# Patient Record
Sex: FEMALE | Race: WHITE | NOT HISPANIC OR LATINO | Employment: OTHER | URBAN - NONMETROPOLITAN AREA
[De-identification: names, ages, dates, MRNs, and addresses within clinical notes are randomized per-mention and may not be internally consistent; named-entity substitution may affect disease eponyms.]

---

## 2019-05-15 ENCOUNTER — HOSPITAL ENCOUNTER (OUTPATIENT)
Dept: MAMMOGRAPHY | Facility: HOSPITAL | Age: 44
Discharge: HOME | End: 2019-05-15
Admitting: NURSE PRACTITIONER

## 2019-05-15 DIAGNOSIS — Z12.39 SCREENING BREAST EXAMINATION: ICD-10-CM

## 2019-05-15 PROCEDURE — 77067 SCR MAMMO BI INCL CAD: CPT | Performed by: RADIOLOGY

## 2019-05-15 PROCEDURE — 77063 BREAST TOMOSYNTHESIS BI: CPT

## 2019-05-15 PROCEDURE — 77067 SCR MAMMO BI INCL CAD: CPT

## 2019-05-15 PROCEDURE — 77063 BREAST TOMOSYNTHESIS BI: CPT | Performed by: RADIOLOGY

## 2019-08-05 ENCOUNTER — OFFICE VISIT (OUTPATIENT)
Dept: SURGERY | Facility: CLINIC | Age: 44
End: 2019-08-05

## 2019-08-05 VITALS — HEIGHT: 64 IN | BODY MASS INDEX: 34.69 KG/M2 | WEIGHT: 203.2 LBS

## 2019-08-05 DIAGNOSIS — G40.219 PARTIAL SYMPTOMATIC EPILEPSY WITH COMPLEX PARTIAL SEIZURES, INTRACTABLE, WITHOUT STATUS EPILEPTICUS (HCC): Primary | ICD-10-CM

## 2019-08-05 PROCEDURE — 99202 OFFICE O/P NEW SF 15 MIN: CPT | Performed by: SURGERY

## 2019-08-05 RX ORDER — ALPRAZOLAM 1 MG/1
TABLET ORAL
Refills: 0 | COMMUNITY
Start: 2019-07-18

## 2019-08-05 RX ORDER — ESCITALOPRAM OXALATE 10 MG/1
10 TABLET ORAL DAILY
Refills: 3 | COMMUNITY
Start: 2019-08-02

## 2019-08-05 RX ORDER — OMEPRAZOLE 40 MG/1
40 CAPSULE, DELAYED RELEASE ORAL DAILY
Refills: 3 | COMMUNITY
Start: 2019-06-20

## 2019-08-05 RX ORDER — LANOLIN ALCOHOL/MO/W.PET/CERES
3 CREAM (GRAM) TOPICAL NIGHTLY PRN
Refills: 0 | COMMUNITY
Start: 2019-07-18

## 2019-08-05 RX ORDER — ATORVASTATIN CALCIUM 10 MG/1
TABLET, FILM COATED ORAL
Refills: 3 | COMMUNITY
Start: 2019-07-18

## 2019-08-05 RX ORDER — NYSTATIN 100000 [USP'U]/G
POWDER TOPICAL
Refills: 0 | COMMUNITY
Start: 2019-07-18

## 2019-08-05 RX ORDER — RANITIDINE 150 MG/1
TABLET ORAL
Refills: 3 | COMMUNITY
Start: 2019-07-23 | End: 2022-02-18

## 2019-08-05 RX ORDER — CLOBAZAM 20 MG/1
20 TABLET ORAL 2 TIMES DAILY
Refills: 2 | COMMUNITY
Start: 2019-08-03

## 2019-08-05 RX ORDER — LACOSAMIDE 200 MG/1
1 TABLET, FILM COATED ORAL 2 TIMES DAILY
Refills: 2 | COMMUNITY
Start: 2019-08-03

## 2019-08-05 RX ORDER — LAMOTRIGINE 150 MG/1
150 TABLET ORAL 2 TIMES DAILY
Refills: 2 | COMMUNITY
Start: 2019-07-13

## 2019-08-05 RX ORDER — CHOLECALCIFEROL (VITAMIN D3) 1250 MCG
CAPSULE ORAL
Refills: 2 | COMMUNITY
Start: 2019-07-23

## 2019-08-05 RX ORDER — ZIPRASIDONE HYDROCHLORIDE 80 MG/1
CAPSULE ORAL
Refills: 2 | COMMUNITY
Start: 2019-08-02

## 2019-08-05 RX ORDER — ZIPRASIDONE HYDROCHLORIDE 40 MG/1
CAPSULE ORAL
Refills: 2 | COMMUNITY
Start: 2019-08-02

## 2019-08-05 NOTE — PROGRESS NOTES
Subjective   Laura Witt is a 43 y.o. female here today for possible VNS replacement referred by Dr. Galarza.    History of Present Illness  Ms. Witt was seen in the office today to discuss VNS replacement.  The patient had the device initially placed in [place] in 2001.  She has had 2 battery changes, the last of which was 5 years ago.  The patient is now due for a battery change.  In order to upgrade to the 106 model the patient is going to have to have both the lead and the generator replacement performed.  The patient last saw her neurologist on 7/22/2019.  The patient is continuing to have seizures.  The mother states that she does not record them all if she did not witness them but there are times when it is obvious from the patient's level of drowsiness that she had a prior seizure.  Allergies   Allergen Reactions   • Bee Venom Anaphylaxis     Current Outpatient Medications   Medication Sig Dispense Refill   • ALPRAZolam (XANAX) 1 MG tablet TAKE ONE TABLET BY MOUTH EVERY DAY FOR ANXIETY  0   • atorvastatin (LIPITOR) 10 MG tablet TAKE ONE TABLET BY MOUTH EVERY NIGHT AT BEDTIME TO LOWER CHOLESTEROL  3   • Cholecalciferol (VITAMIN D3) 11383 units capsule TAKE ONE CAPSULE BY MOUTH EVERY WEEK FOR THE BONES  2   • escitalopram (LEXAPRO) 10 MG tablet Take 10 mg by mouth Daily. as directed  3   • LAMICTAL 150 MG tablet Take 150 mg by mouth 2 (Two) Times a Day. as directed  2   • melatonin 3 MG tablet TAKE ONE TABLET BY MOUTH EVERY NIGHT ONE HOUR BEFORE BEDTIME FOR SLEEP  0   • NYSTATIN 662897 UNIT/GM topical powder APPLY TO AFFECTED AREA EVERY DAY AS NEEDED  0   • omeprazole (priLOSEC) 40 MG capsule Take 40 mg by mouth Daily.  3   • ONFI 20 MG tablet Take 20 mg by mouth 2 (Two) Times a Day. as directed  2   • raNITIdine (ZANTAC) 150 MG tablet TAKE ONE TABLET BY MOUTH EVERY DAY FOR THE STOMACH  3   • VIMPAT 200 MG tablet Take 1 tablet by mouth 2 (Two) Times a Day. as directed  2   • ziprasidone (GEODON) 40  "MG capsule TAKE ONE CAPSULE BY MOUTH EVERY DAY AT NOON AS DIRECTED  2   • ziprasidone (GEODON) 80 MG capsule TAKE ONE CAPSULE BY MOUTH EVERY NIGHT AT BEDTIME AS DIRECTED  2     No current facility-administered medications for this visit.      Past Medical History:   Diagnosis Date   • Arthritis    • Hypothyroid    • Seizures (CMS/HCC)      Past Surgical History:   Procedure Laterality Date   • IMPLANTATION VAGAL NERVE STIMULATOR     • THYROID SURGERY       Review of Systems  General: negative  Integumentary: negative  Eyes: glasses  ENT: negative  Respiratory: shortness of breath  Gastrointestinal: abdominal pain  Cardiovascular: negative  Neurological: confusion  Psychiatric: depression  Hematologic/Lymphatic: negative  Genitourinary: negative  Musculoskeletal: negative  Endocrine: goiter  Breasts: negative        Objective   Ht 162.6 cm (64\")   Wt 92.2 kg (203 lb 3.2 oz)   BMI 34.88 kg/m²   Physical Exam  General:  This is a WD WN female in no acute distress  HEENT exam:  WNL. Sclera are anicteric.  EOMI  Neck:  supple, FROM, without thyromegaly, cervical or supraclavicular adenopathy  Lungs:  Respiratory effort normal. Auscultation: Clear, without wheezes, rhonchi, rales  Heart:  Regular rate and rhythm, without murmur, gallop, rub.  No pedal edema  Abdomen: Nontender, nondistended  Musculoskeletal:  muscle strength/tone is normal.  Gait and station: normal. No digital cyanosis  Psyc:  alert, oriented x 3.  Mood and affect are appropriate  skin:  Warm with good turgor.  Without rash or lesion.  Vagus nerve stimulator in the left anterior chest  extremities:  Examination of the extremities revealed no cyanosis, clubbing or edema.  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       Discussion/Summary: The risks of " the procedure particularly with replacement of a new stimulator include injury to the carotid and jugular, nerve injury.    Patient's Body mass index is 34.88 kg/m². BMI is above normal and info given.       No future appointments.      Please note that portions of this note were completed with a voice recognition program.

## 2019-08-30 NOTE — H&P
Subjective   Laura Witt is a 43 y.o. female here today for possible VNS replacement referred by Dr. Galarza.    History of Present Illness  Ms. Witt was seen in the office today to discuss VNS replacement.  The patient had the device initially placed in [place] in 2001.  She has had 2 battery changes, the last of which was 5 years ago.  The patient is now due for a battery change.  In order to upgrade to the 106 model the patient is going to have to have both the lead and the generator replacement performed.  The patient last saw her neurologist on 7/22/2019.  The patient is continuing to have seizures.  The mother states that she does not record them all if she did not witness them but there are times when it is obvious from the patient's level of drowsiness that she had a prior seizure.  Allergies   Allergen Reactions   • Bee Venom Anaphylaxis     Current Outpatient Medications   Medication Sig Dispense Refill   • ALPRAZolam (XANAX) 1 MG tablet TAKE ONE TABLET BY MOUTH EVERY DAY FOR ANXIETY  0   • atorvastatin (LIPITOR) 10 MG tablet TAKE ONE TABLET BY MOUTH EVERY NIGHT AT BEDTIME TO LOWER CHOLESTEROL  3   • Cholecalciferol (VITAMIN D3) 87470 units capsule TAKE ONE CAPSULE BY MOUTH EVERY WEEK FOR THE BONES  2   • escitalopram (LEXAPRO) 10 MG tablet Take 10 mg by mouth Daily. as directed  3   • LAMICTAL 150 MG tablet Take 150 mg by mouth 2 (Two) Times a Day. as directed  2   • melatonin 3 MG tablet TAKE ONE TABLET BY MOUTH EVERY NIGHT ONE HOUR BEFORE BEDTIME FOR SLEEP  0   • NYSTATIN 590601 UNIT/GM topical powder APPLY TO AFFECTED AREA EVERY DAY AS NEEDED  0   • omeprazole (priLOSEC) 40 MG capsule Take 40 mg by mouth Daily.  3   • ONFI 20 MG tablet Take 20 mg by mouth 2 (Two) Times a Day. as directed  2   • raNITIdine (ZANTAC) 150 MG tablet TAKE ONE TABLET BY MOUTH EVERY DAY FOR THE STOMACH  3   • VIMPAT 200 MG tablet Take 1 tablet by mouth 2 (Two) Times a Day. as directed  2   • ziprasidone (GEODON) 40  "MG capsule TAKE ONE CAPSULE BY MOUTH EVERY DAY AT NOON AS DIRECTED  2   • ziprasidone (GEODON) 80 MG capsule TAKE ONE CAPSULE BY MOUTH EVERY NIGHT AT BEDTIME AS DIRECTED  2     No current facility-administered medications for this visit.      Past Medical History:   Diagnosis Date   • Arthritis    • Hypothyroid    • Seizures (CMS/HCC)      Past Surgical History:   Procedure Laterality Date   • IMPLANTATION VAGAL NERVE STIMULATOR     • THYROID SURGERY       Review of Systems  General: negative  Integumentary: negative  Eyes: glasses  ENT: negative  Respiratory: shortness of breath  Gastrointestinal: abdominal pain  Cardiovascular: negative  Neurological: confusion  Psychiatric: depression  Hematologic/Lymphatic: negative  Genitourinary: negative  Musculoskeletal: negative  Endocrine: goiter  Breasts: negative        Objective   Ht 162.6 cm (64\")   Wt 92.2 kg (203 lb 3.2 oz)   BMI 34.88 kg/m²    Physical Exam  General:  This is a WD WN female in no acute distress  HEENT exam:  WNL. Sclera are anicteric.  EOMI  Neck:  supple, FROM, without thyromegaly, cervical or supraclavicular adenopathy  Lungs:  Respiratory effort normal. Auscultation: Clear, without wheezes, rhonchi, rales  Heart:  Regular rate and rhythm, without murmur, gallop, rub.  No pedal edema  Abdomen: Nontender, nondistended  Musculoskeletal:  muscle strength/tone is normal.  Gait and station: normal. No digital cyanosis  Psyc:  alert, oriented x 3.  Mood and affect are appropriate  skin:  Warm with good turgor.  Without rash or lesion.  Vagus nerve stimulator in the left anterior chest  extremities:  Examination of the extremities revealed no cyanosis, clubbing or edema.  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       Discussion/Summary: The risks " of the procedure particularly with replacement of a new stimulator include injury to the carotid and jugular, nerve injury.    Patient's Body mass index is 34.88 kg/m². BMI is above normal and info given.       No future appointments.      Please note that portions of this note were completed with a voice recognition program.  This document has been electronically signed by Merly GARNICA MD on August 30, 2019 11:30 AM

## 2019-09-03 ENCOUNTER — TELEPHONE (OUTPATIENT)
Dept: SURGERY | Facility: CLINIC | Age: 44
End: 2019-09-03

## 2019-09-04 ENCOUNTER — APPOINTMENT (OUTPATIENT)
Dept: PREADMISSION TESTING | Facility: HOSPITAL | Age: 44
End: 2019-09-04

## 2019-09-04 DIAGNOSIS — G40.219 PARTIAL SYMPTOMATIC EPILEPSY WITH COMPLEX PARTIAL SEIZURES, INTRACTABLE, WITHOUT STATUS EPILEPTICUS (HCC): ICD-10-CM

## 2019-09-04 LAB
ANION GAP SERPL CALCULATED.3IONS-SCNC: 11.3 MMOL/L (ref 5–15)
BUN BLD-MCNC: 12 MG/DL (ref 6–20)
BUN/CREAT SERPL: 16 (ref 7–25)
CALCIUM SPEC-SCNC: 9 MG/DL (ref 8.6–10.5)
CHLORIDE SERPL-SCNC: 103 MMOL/L (ref 98–107)
CO2 SERPL-SCNC: 27.7 MMOL/L (ref 22–29)
CREAT BLD-MCNC: 0.75 MG/DL (ref 0.57–1)
DEPRECATED RDW RBC AUTO: 44.2 FL (ref 37–54)
ERYTHROCYTE [DISTWIDTH] IN BLOOD BY AUTOMATED COUNT: 13 % (ref 12.3–15.4)
GFR SERPL CREATININE-BSD FRML MDRD: 84 ML/MIN/1.73
GLUCOSE BLD-MCNC: 90 MG/DL (ref 65–99)
HCT VFR BLD AUTO: 39.8 % (ref 34–46.6)
HGB BLD-MCNC: 13 G/DL (ref 12–15.9)
MCH RBC QN AUTO: 31.6 PG (ref 26.6–33)
MCHC RBC AUTO-ENTMCNC: 32.7 G/DL (ref 31.5–35.7)
MCV RBC AUTO: 96.6 FL (ref 79–97)
PLATELET # BLD AUTO: 213 10*3/MM3 (ref 140–450)
PMV BLD AUTO: 10.4 FL (ref 6–12)
POTASSIUM BLD-SCNC: 3.8 MMOL/L (ref 3.5–5.2)
RBC # BLD AUTO: 4.12 10*6/MM3 (ref 3.77–5.28)
SODIUM BLD-SCNC: 142 MMOL/L (ref 136–145)
WBC NRBC COR # BLD: 4.78 10*3/MM3 (ref 3.4–10.8)

## 2019-09-04 PROCEDURE — 85027 COMPLETE CBC AUTOMATED: CPT | Performed by: SURGERY

## 2019-09-04 PROCEDURE — 80048 BASIC METABOLIC PNL TOTAL CA: CPT | Performed by: SURGERY

## 2019-09-04 PROCEDURE — 36415 COLL VENOUS BLD VENIPUNCTURE: CPT

## 2019-09-04 RX ORDER — CETIRIZINE HYDROCHLORIDE 10 MG/1
10 TABLET ORAL DAILY
COMMUNITY

## 2019-09-04 NOTE — DISCHARGE INSTRUCTIONS

## 2019-09-09 ENCOUNTER — TELEPHONE (OUTPATIENT)
Dept: SURGERY | Facility: CLINIC | Age: 44
End: 2019-09-09

## 2019-09-10 ENCOUNTER — ANESTHESIA (OUTPATIENT)
Dept: PERIOP | Facility: HOSPITAL | Age: 44
End: 2019-09-10

## 2019-09-10 ENCOUNTER — ANESTHESIA EVENT (OUTPATIENT)
Dept: PERIOP | Facility: HOSPITAL | Age: 44
End: 2019-09-10

## 2019-09-10 ENCOUNTER — HOSPITAL ENCOUNTER (OUTPATIENT)
Facility: HOSPITAL | Age: 44
Setting detail: HOSPITAL OUTPATIENT SURGERY
Discharge: HOME | End: 2019-09-10
Attending: SURGERY | Admitting: SURGERY

## 2019-09-10 VITALS
SYSTOLIC BLOOD PRESSURE: 112 MMHG | HEART RATE: 72 BPM | TEMPERATURE: 97.7 F | HEIGHT: 64 IN | WEIGHT: 205 LBS | OXYGEN SATURATION: 98 % | BODY MASS INDEX: 35 KG/M2 | RESPIRATION RATE: 18 BRPM | DIASTOLIC BLOOD PRESSURE: 73 MMHG

## 2019-09-10 DIAGNOSIS — G40.219 PARTIAL SYMPTOMATIC EPILEPSY WITH COMPLEX PARTIAL SEIZURES, INTRACTABLE, WITHOUT STATUS EPILEPTICUS (HCC): ICD-10-CM

## 2019-09-10 LAB
B-HCG UR QL: NEGATIVE
INTERNAL NEGATIVE CONTROL: NEGATIVE
INTERNAL POSITIVE CONTROL: POSITIVE
Lab: NORMAL

## 2019-09-10 PROCEDURE — 25010000002 NEOSTIGMINE 10 MG/10ML SOLUTION: Performed by: NURSE ANESTHETIST, CERTIFIED REGISTERED

## 2019-09-10 PROCEDURE — 25010000002 FENTANYL CITRATE (PF) 100 MCG/2ML SOLUTION: Performed by: NURSE ANESTHETIST, CERTIFIED REGISTERED

## 2019-09-10 PROCEDURE — 25010000002 MIDAZOLAM PER 1 MG: Performed by: NURSE ANESTHETIST, CERTIFIED REGISTERED

## 2019-09-10 PROCEDURE — 25010000003 CEFAZOLIN SODIUM-DEXTROSE 2-3 GM-%(50ML) RECONSTITUTED SOLUTION: Performed by: SURGERY

## 2019-09-10 PROCEDURE — 64570 REMOVE VAGUS N ELTRD: CPT | Performed by: SURGERY

## 2019-09-10 PROCEDURE — 25010000002 ONDANSETRON PER 1 MG: Performed by: NURSE ANESTHETIST, CERTIFIED REGISTERED

## 2019-09-10 PROCEDURE — 25010000002 PROPOFOL 10 MG/ML EMULSION: Performed by: NURSE ANESTHETIST, CERTIFIED REGISTERED

## 2019-09-10 PROCEDURE — 64568 OPN IMPLTJ CRNL NRV NEA&PG: CPT | Performed by: SURGERY

## 2019-09-10 PROCEDURE — C1778 LEAD, NEUROSTIMULATOR: HCPCS | Performed by: SURGERY

## 2019-09-10 PROCEDURE — C1767 GENERATOR, NEURO NON-RECHARG: HCPCS | Performed by: SURGERY

## 2019-09-10 PROCEDURE — 81025 URINE PREGNANCY TEST: CPT | Performed by: ANESTHESIOLOGY

## 2019-09-10 DEVICE — IMPLANTABLE LEAD
Type: IMPLANTABLE DEVICE | Site: CHEST | Status: FUNCTIONAL
Brand: VNS THERAPY® PERENNIADURA® MODEL 303 LEAD

## 2019-09-10 DEVICE — IMPLANTABLE PULSE GENERATOR
Type: IMPLANTABLE DEVICE | Site: CHEST | Status: FUNCTIONAL
Brand: VNS THERAPY® ASPIRESR® MODEL 106 GENERATOR

## 2019-09-10 RX ORDER — CEFAZOLIN SODIUM 2 G/50ML
2 SOLUTION INTRAVENOUS ONCE
Status: COMPLETED | OUTPATIENT
Start: 2019-09-10 | End: 2019-09-10

## 2019-09-10 RX ORDER — GLYCOPYRROLATE 0.2 MG/ML
INJECTION INTRAMUSCULAR; INTRAVENOUS AS NEEDED
Status: DISCONTINUED | OUTPATIENT
Start: 2019-09-10 | End: 2019-09-10 | Stop reason: SURG

## 2019-09-10 RX ORDER — MIDAZOLAM HYDROCHLORIDE 1 MG/ML
INJECTION INTRAMUSCULAR; INTRAVENOUS AS NEEDED
Status: DISCONTINUED | OUTPATIENT
Start: 2019-09-10 | End: 2019-09-10 | Stop reason: SURG

## 2019-09-10 RX ORDER — HYDROCODONE BITARTRATE AND ACETAMINOPHEN 7.5; 325 MG/1; MG/1
1 TABLET ORAL 4 TIMES DAILY PRN
Qty: 15 TABLET | Refills: 0 | Status: SHIPPED | OUTPATIENT
Start: 2019-09-10 | End: 2022-02-18

## 2019-09-10 RX ORDER — FENTANYL CITRATE 50 UG/ML
50 INJECTION, SOLUTION INTRAMUSCULAR; INTRAVENOUS
Status: DISCONTINUED | OUTPATIENT
Start: 2019-09-10 | End: 2019-09-10 | Stop reason: HOSPADM

## 2019-09-10 RX ORDER — NEOSTIGMINE METHYLSULFATE 1 MG/ML
INJECTION, SOLUTION INTRAVENOUS AS NEEDED
Status: DISCONTINUED | OUTPATIENT
Start: 2019-09-10 | End: 2019-09-10 | Stop reason: SURG

## 2019-09-10 RX ORDER — SODIUM CHLORIDE 0.9 % (FLUSH) 0.9 %
3 SYRINGE (ML) INJECTION EVERY 12 HOURS SCHEDULED
Status: DISCONTINUED | OUTPATIENT
Start: 2019-09-10 | End: 2019-09-10 | Stop reason: HOSPADM

## 2019-09-10 RX ORDER — ONDANSETRON 2 MG/ML
INJECTION INTRAMUSCULAR; INTRAVENOUS AS NEEDED
Status: DISCONTINUED | OUTPATIENT
Start: 2019-09-10 | End: 2019-09-10 | Stop reason: SURG

## 2019-09-10 RX ORDER — MEPERIDINE HYDROCHLORIDE 25 MG/ML
12.5 INJECTION INTRAMUSCULAR; INTRAVENOUS; SUBCUTANEOUS
Status: DISCONTINUED | OUTPATIENT
Start: 2019-09-10 | End: 2019-09-10 | Stop reason: HOSPADM

## 2019-09-10 RX ORDER — OXYCODONE HYDROCHLORIDE AND ACETAMINOPHEN 5; 325 MG/1; MG/1
1 TABLET ORAL ONCE AS NEEDED
Status: DISCONTINUED | OUTPATIENT
Start: 2019-09-10 | End: 2019-09-10 | Stop reason: HOSPADM

## 2019-09-10 RX ORDER — PROPOFOL 10 MG/ML
VIAL (ML) INTRAVENOUS AS NEEDED
Status: DISCONTINUED | OUTPATIENT
Start: 2019-09-10 | End: 2019-09-10 | Stop reason: SURG

## 2019-09-10 RX ORDER — SODIUM CHLORIDE 0.9 % (FLUSH) 0.9 %
3-10 SYRINGE (ML) INJECTION AS NEEDED
Status: DISCONTINUED | OUTPATIENT
Start: 2019-09-10 | End: 2019-09-10 | Stop reason: HOSPADM

## 2019-09-10 RX ORDER — ROCURONIUM BROMIDE 10 MG/ML
INJECTION, SOLUTION INTRAVENOUS AS NEEDED
Status: DISCONTINUED | OUTPATIENT
Start: 2019-09-10 | End: 2019-09-10 | Stop reason: SURG

## 2019-09-10 RX ORDER — ONDANSETRON 2 MG/ML
4 INJECTION INTRAMUSCULAR; INTRAVENOUS AS NEEDED
Status: DISCONTINUED | OUTPATIENT
Start: 2019-09-10 | End: 2019-09-10 | Stop reason: HOSPADM

## 2019-09-10 RX ORDER — MAGNESIUM HYDROXIDE 1200 MG/15ML
LIQUID ORAL AS NEEDED
Status: DISCONTINUED | OUTPATIENT
Start: 2019-09-10 | End: 2019-09-10 | Stop reason: HOSPADM

## 2019-09-10 RX ORDER — FENTANYL CITRATE 50 UG/ML
INJECTION, SOLUTION INTRAMUSCULAR; INTRAVENOUS AS NEEDED
Status: DISCONTINUED | OUTPATIENT
Start: 2019-09-10 | End: 2019-09-10 | Stop reason: SURG

## 2019-09-10 RX ORDER — CEFAZOLIN SODIUM 2 G/50ML
2 SOLUTION INTRAVENOUS ONCE
Status: DISCONTINUED | OUTPATIENT
Start: 2019-09-10 | End: 2019-09-10 | Stop reason: HOSPADM

## 2019-09-10 RX ORDER — FAMOTIDINE 10 MG/ML
INJECTION, SOLUTION INTRAVENOUS AS NEEDED
Status: DISCONTINUED | OUTPATIENT
Start: 2019-09-10 | End: 2019-09-10 | Stop reason: SURG

## 2019-09-10 RX ORDER — IPRATROPIUM BROMIDE AND ALBUTEROL SULFATE 2.5; .5 MG/3ML; MG/3ML
3 SOLUTION RESPIRATORY (INHALATION) ONCE AS NEEDED
Status: DISCONTINUED | OUTPATIENT
Start: 2019-09-10 | End: 2019-09-10 | Stop reason: HOSPADM

## 2019-09-10 RX ORDER — SODIUM CHLORIDE, SODIUM LACTATE, POTASSIUM CHLORIDE, CALCIUM CHLORIDE 600; 310; 30; 20 MG/100ML; MG/100ML; MG/100ML; MG/100ML
125 INJECTION, SOLUTION INTRAVENOUS CONTINUOUS
Status: DISCONTINUED | OUTPATIENT
Start: 2019-09-10 | End: 2019-09-10 | Stop reason: HOSPADM

## 2019-09-10 RX ORDER — LIDOCAINE HYDROCHLORIDE 20 MG/ML
INJECTION, SOLUTION INFILTRATION; PERINEURAL AS NEEDED
Status: DISCONTINUED | OUTPATIENT
Start: 2019-09-10 | End: 2019-09-10 | Stop reason: SURG

## 2019-09-10 RX ADMIN — FENTANYL CITRATE 50 MCG: 50 INJECTION INTRAMUSCULAR; INTRAVENOUS at 16:08

## 2019-09-10 RX ADMIN — CEFAZOLIN SODIUM 2 G: 2 SOLUTION INTRAVENOUS at 14:15

## 2019-09-10 RX ADMIN — SODIUM CHLORIDE, POTASSIUM CHLORIDE, SODIUM LACTATE AND CALCIUM CHLORIDE 125 ML/HR: 600; 310; 30; 20 INJECTION, SOLUTION INTRAVENOUS at 13:42

## 2019-09-10 RX ADMIN — LIDOCAINE HYDROCHLORIDE 40 MG: 20 INJECTION, SOLUTION INFILTRATION; PERINEURAL at 14:06

## 2019-09-10 RX ADMIN — MIDAZOLAM HYDROCHLORIDE 2 MG: 1 INJECTION, SOLUTION INTRAMUSCULAR; INTRAVENOUS at 14:06

## 2019-09-10 RX ADMIN — NEOSTIGMINE METHYLSULFATE 3 MG: 1 INJECTION, SOLUTION INTRAVENOUS at 15:30

## 2019-09-10 RX ADMIN — ONDANSETRON 4 MG: 2 INJECTION INTRAMUSCULAR; INTRAVENOUS at 14:06

## 2019-09-10 RX ADMIN — FENTANYL CITRATE 25 MCG: 50 INJECTION INTRAMUSCULAR; INTRAVENOUS at 14:53

## 2019-09-10 RX ADMIN — GLYCOPYRROLATE 0.4 MG: 0.4 INJECTION INTRAMUSCULAR; INTRAVENOUS at 15:30

## 2019-09-10 RX ADMIN — FAMOTIDINE 20 MG: 10 INJECTION INTRAVENOUS at 14:06

## 2019-09-10 RX ADMIN — FENTANYL CITRATE 50 MCG: 50 INJECTION INTRAMUSCULAR; INTRAVENOUS at 14:06

## 2019-09-10 RX ADMIN — SODIUM CHLORIDE, POTASSIUM CHLORIDE, SODIUM LACTATE AND CALCIUM CHLORIDE: 600; 310; 30; 20 INJECTION, SOLUTION INTRAVENOUS at 15:18

## 2019-09-10 RX ADMIN — PROPOFOL 150 MG: 10 INJECTION, EMULSION INTRAVENOUS at 14:08

## 2019-09-10 RX ADMIN — FENTANYL CITRATE 25 MCG: 50 INJECTION INTRAMUSCULAR; INTRAVENOUS at 14:25

## 2019-09-10 RX ADMIN — ROCURONIUM BROMIDE 40 MG: 10 SOLUTION INTRAVENOUS at 14:08

## 2019-09-10 NOTE — ANESTHESIA POSTPROCEDURE EVALUATION
Patient: Laura Witt    Procedure Summary     Date:  09/10/19 Room / Location:   [facility] /  [facility]    Anesthesia Start:  1402 Anesthesia Stop:  1545    Procedures:       VAGUS NERVE STIMULATOR REMOVAL (N/A )      VAGUS NERVE STIMULATOR IMPLANTATION (N/A Chest) Diagnosis:       Partial symptomatic epilepsy with complex partial seizures, intractable, without status epilepticus (CMS/HCC)      (Partial symptomatic epilepsy with complex partial seizures, intractable, without status epilepticus (CMS/HCC) [G40.219])    Surgeon:  Merly Jordan MD Provider:  Bennie Marshall MD    Anesthesia Type:  general ASA Status:  3          Anesthesia Type: general  Last vitals  BP   104/74 (09/10/19 1329)   Temp   99.1 °F (37.3 °C) (09/10/19 1329)   Pulse   69 (09/10/19 1329)   Resp   20 (09/10/19 1329)     SpO2   97 % (09/10/19 1329)     Post Anesthesia Care and Evaluation    Patient location during evaluation: bedside  Patient participation: complete - patient participated  Level of consciousness: awake and alert  Pain score: 1  Pain management: adequate  Airway patency: patent  Anesthetic complications: No anesthetic complications  PONV Status: none  Cardiovascular status: acceptable  Respiratory status: acceptable  Hydration status: acceptable

## 2019-09-10 NOTE — ANESTHESIA PROCEDURE NOTES
Airway  Urgency: elective    Date/Time: 9/10/2019 2:09 PM  Airway not difficult    General Information and Staff    Patient location during procedure: OR  CRNA: Nora Cloeman CRNA    Indications and Patient Condition  Indications for airway management: airway protection    Preoxygenated: yes  MILS maintained throughout  Mask difficulty assessment: 1 - vent by mask    Final Airway Details  Final airway type: endotracheal airway      Successful airway: ETT  Cuffed: yes   Successful intubation technique: direct laryngoscopy  Facilitating devices/methods: intubating stylet  Endotracheal tube insertion site: oral  Blade: Leticia  Blade size: 3  ETT size (mm): 7.0  Cormack-Lehane Classification: grade IIa - partial view of glottis  Placement verified by: chest auscultation and capnometry   Measured from: lips  ETT/EBT  to lips (cm): 19  Number of attempts at approach: 1  Assessment: lips, teeth, and gum same as pre-op and atraumatic intubation

## 2019-09-10 NOTE — ANESTHESIA PREPROCEDURE EVALUATION
Anesthesia Evaluation     no history of anesthetic complications:  NPO Solid Status: > 8 hours  NPO Liquid Status: > 8 hours           Airway   Mallampati: II  TM distance: >3 FB  Neck ROM: full  No difficulty expected  Dental - normal exam     Pulmonary - normal exam   Cardiovascular - normal exam    (+) hyperlipidemia,       Neuro/Psych  (+) seizures,     GI/Hepatic/Renal/Endo    (+)   hypothyroidism,     Musculoskeletal     Abdominal  - normal exam   Substance History      OB/GYN          Other        ROS/Med Hx Other: Mild MR                  Anesthesia Plan    ASA 3     general     intravenous induction   Anesthetic plan, all risks, benefits, and alternatives have been provided, discussed and informed consent has been obtained with: patient and mother.

## 2019-09-23 ENCOUNTER — OFFICE VISIT (OUTPATIENT)
Dept: SURGERY | Facility: CLINIC | Age: 44
End: 2019-09-23

## 2019-09-23 ENCOUNTER — TRANSCRIBE ORDERS (OUTPATIENT)
Dept: OTHER | Facility: OTHER | Age: 44
End: 2019-09-23

## 2019-09-23 ENCOUNTER — HOSPITAL ENCOUNTER (OUTPATIENT)
Dept: GENERAL RADIOLOGY | Facility: HOSPITAL | Age: 44
Discharge: HOME | End: 2019-09-23
Admitting: PSYCHIATRY & NEUROLOGY

## 2019-09-23 VITALS
DIASTOLIC BLOOD PRESSURE: 79 MMHG | SYSTOLIC BLOOD PRESSURE: 116 MMHG | HEIGHT: 64 IN | HEART RATE: 81 BPM | WEIGHT: 202.6 LBS | BODY MASS INDEX: 34.59 KG/M2

## 2019-09-23 DIAGNOSIS — M54.2 CERVICALGIA: ICD-10-CM

## 2019-09-23 DIAGNOSIS — G40.219 PARTIAL SYMPTOMATIC EPILEPSY WITH COMPLEX PARTIAL SEIZURES, INTRACTABLE, WITHOUT STATUS EPILEPTICUS (HCC): Primary | ICD-10-CM

## 2019-09-23 DIAGNOSIS — M54.2 CERVICALGIA: Primary | ICD-10-CM

## 2019-09-23 DIAGNOSIS — Z09 POSTOP CHECK: ICD-10-CM

## 2019-09-23 PROCEDURE — 99024 POSTOP FOLLOW-UP VISIT: CPT | Performed by: SURGERY

## 2019-09-23 PROCEDURE — 72050 X-RAY EXAM NECK SPINE 4/5VWS: CPT | Performed by: RADIOLOGY

## 2019-09-23 PROCEDURE — 72050 X-RAY EXAM NECK SPINE 4/5VWS: CPT

## 2019-09-23 NOTE — PROGRESS NOTES
Subjective   Laura Witt is a 43 y.o. female here today for post op.    History of Present Illness  Ms. Witt was seen in the office today for a postoperative visit following placement of a new vagus nerve stimulator device.  Patient voices no complaints related to her surgery  Allergies   Allergen Reactions   • Bee Venom Anaphylaxis         Current Outpatient Medications   Medication Sig Dispense Refill   • ALPRAZolam (XANAX) 1 MG tablet TAKE ONE TABLET BY MOUTH EVERY DAY FOR ANXIETY  0   • atorvastatin (LIPITOR) 10 MG tablet TAKE ONE TABLET BY MOUTH EVERY NIGHT AT BEDTIME TO LOWER CHOLESTEROL  3   • cetirizine (zyrTEC) 10 MG tablet Take 10 mg by mouth Daily.     • Cholecalciferol (VITAMIN D3) 26448 units capsule TAKE ONE CAPSULE BY MOUTH EVERY WEEK FOR THE BONES  2   • EPINEPHrine (EPIPEN 2-RUSH IJ) Inject 1 pen as directed As Needed.     • escitalopram (LEXAPRO) 10 MG tablet Take 10 mg by mouth Daily. as directed  3   • HYDROcodone-acetaminophen (NORCO) 7.5-325 MG per tablet Take 1 tablet by mouth 4 (Four) Times a Day As Needed for Moderate Pain. 15 tablet 0   • LAMICTAL 150 MG tablet Take 150 mg by mouth 2 (Two) Times a Day. as directed  2   • melatonin 3 MG tablet TAKE ONE TABLET BY MOUTH EVERY NIGHT ONE HOUR BEFORE BEDTIME FOR SLEEP  0   • NYSTATIN 053950 UNIT/GM topical powder APPLY TO AFFECTED AREA EVERY DAY AS NEEDED  0   • omeprazole (priLOSEC) 40 MG capsule Take 40 mg by mouth Daily.  3   • ONFI 20 MG tablet Take 20 mg by mouth 2 (Two) Times a Day. as directed  2   • raNITIdine (ZANTAC) 150 MG tablet TAKE ONE TABLET BY MOUTH EVERY DAY FOR THE STOMACH  3   • VIMPAT 200 MG tablet Take 1 tablet by mouth 2 (Two) Times a Day. as directed  2   • ziprasidone (GEODON) 40 MG capsule TAKE ONE CAPSULE BY MOUTH EVERY DAY AT NOON AS DIRECTED  2   • ziprasidone (GEODON) 80 MG capsule TAKE ONE CAPSULE BY MOUTH EVERY NIGHT AT BEDTIME AS DIRECTED  2   • HYDROcodone-acetaminophen (NORCO) 7.5-325 MG per tablet Take 1  "tablet by mouth 4 (Four) Times a Day As Needed for Moderate Pain . 15 tablet 0     No current facility-administered medications for this visit.        Objective   /79 (BP Location: Left arm)   Pulse 81   Ht 162.6 cm (64\")   Wt 91.9 kg (202 lb 9.6 oz)   BMI 34.78 kg/m²    Physical Exam  On examination this is a well-developed well-nourished female in no acute distress  HEENT examination: Within normal limits.  Conjunctiva pink.  Nose and ears appear normal.  Neck: Supple, full range of motion.  No JVD.  Musculoskeletal: Full range of motion all extremities without focal weakness. Normal gait. No digital cyanosis.  Psych: Patient is alert, oriented x3. Mood and affect are appropriate.  Skin: Healing surgical scars in the left neck and chest  Results/Data      Procedures     Assessment/Plan   Stable course, status post vagus nerve stimulator replacement    Follow-up as needed       Discussion/Summary    Future Appointments   Date Time Provider Department Center   9/23/2019  1:50 PM Merly Jordan MD [facility] None         Please note that portions of this note were completed with a voice recognition program.  "

## 2019-11-22 ENCOUNTER — TRANSCRIBE ORDERS (OUTPATIENT)
Dept: ADMINISTRATIVE | Facility: HOSPITAL | Age: 44
End: 2019-11-22

## 2019-11-22 DIAGNOSIS — R10.2 FEMALE PELVIC-PERINEAL PAIN SYNDROME: Primary | ICD-10-CM

## 2019-12-04 ENCOUNTER — HOSPITAL ENCOUNTER (OUTPATIENT)
Dept: ULTRASOUND IMAGING | Facility: HOSPITAL | Age: 44
Discharge: HOME | End: 2019-12-04
Admitting: OBSTETRICS & GYNECOLOGY

## 2019-12-04 DIAGNOSIS — R10.2 FEMALE PELVIC-PERINEAL PAIN SYNDROME: ICD-10-CM

## 2019-12-04 PROCEDURE — 76856 US EXAM PELVIC COMPLETE: CPT

## 2019-12-04 PROCEDURE — 76856 US EXAM PELVIC COMPLETE: CPT | Performed by: RADIOLOGY

## 2020-01-13 ENCOUNTER — TRANSCRIBE ORDERS (OUTPATIENT)
Dept: ADMINISTRATIVE | Facility: HOSPITAL | Age: 45
End: 2020-01-13

## 2020-01-13 DIAGNOSIS — R10.9 ABDOMINAL PAIN, UNSPECIFIED ABDOMINAL LOCATION: Primary | ICD-10-CM

## 2020-01-16 ENCOUNTER — APPOINTMENT (OUTPATIENT)
Dept: ULTRASOUND IMAGING | Facility: HOSPITAL | Age: 45
End: 2020-01-16

## 2020-01-17 ENCOUNTER — HOSPITAL ENCOUNTER (OUTPATIENT)
Dept: ULTRASOUND IMAGING | Facility: HOSPITAL | Age: 45
Discharge: HOME | End: 2020-01-17
Admitting: NURSE PRACTITIONER

## 2020-01-17 DIAGNOSIS — R10.9 ABDOMINAL PAIN, UNSPECIFIED ABDOMINAL LOCATION: ICD-10-CM

## 2020-01-17 PROCEDURE — 76705 ECHO EXAM OF ABDOMEN: CPT

## 2020-01-17 PROCEDURE — 76705 ECHO EXAM OF ABDOMEN: CPT | Performed by: RADIOLOGY

## 2020-07-30 ENCOUNTER — TRANSCRIBE ORDERS (OUTPATIENT)
Dept: ADMINISTRATIVE | Facility: HOSPITAL | Age: 45
End: 2020-07-30

## 2020-07-30 DIAGNOSIS — E04.2 MULTINODULAR GOITER: Primary | ICD-10-CM

## 2020-08-04 ENCOUNTER — APPOINTMENT (OUTPATIENT)
Dept: ULTRASOUND IMAGING | Facility: HOSPITAL | Age: 45
End: 2020-08-04

## 2020-08-05 ENCOUNTER — HOSPITAL ENCOUNTER (OUTPATIENT)
Dept: ULTRASOUND IMAGING | Facility: HOSPITAL | Age: 45
Discharge: HOME | End: 2020-08-05
Admitting: OTOLARYNGOLOGY

## 2020-08-05 DIAGNOSIS — E04.2 MULTINODULAR GOITER: ICD-10-CM

## 2020-08-05 PROCEDURE — 76536 US EXAM OF HEAD AND NECK: CPT | Performed by: RADIOLOGY

## 2020-08-05 PROCEDURE — 76536 US EXAM OF HEAD AND NECK: CPT

## 2020-11-02 ENCOUNTER — HOSPITAL ENCOUNTER (OUTPATIENT)
Dept: MAMMOGRAPHY | Facility: HOSPITAL | Age: 45
Discharge: HOME | End: 2020-11-02
Admitting: NURSE PRACTITIONER

## 2020-11-02 DIAGNOSIS — Z12.31 VISIT FOR SCREENING MAMMOGRAM: ICD-10-CM

## 2020-11-02 PROCEDURE — 77067 SCR MAMMO BI INCL CAD: CPT | Performed by: RADIOLOGY

## 2020-11-02 PROCEDURE — 77067 SCR MAMMO BI INCL CAD: CPT

## 2020-11-02 PROCEDURE — 77063 BREAST TOMOSYNTHESIS BI: CPT | Performed by: RADIOLOGY

## 2020-11-02 PROCEDURE — 77063 BREAST TOMOSYNTHESIS BI: CPT

## 2020-11-09 ENCOUNTER — TRANSCRIBE ORDERS (OUTPATIENT)
Dept: ADMINISTRATIVE | Facility: HOSPITAL | Age: 45
End: 2020-11-09

## 2020-11-09 DIAGNOSIS — Z01.818 PRE-OPERATIVE CLEARANCE: Primary | ICD-10-CM

## 2020-11-10 ENCOUNTER — LAB (OUTPATIENT)
Dept: LAB | Facility: HOSPITAL | Age: 45
End: 2020-11-10

## 2020-11-10 DIAGNOSIS — Z01.818 PRE-OPERATIVE CLEARANCE: ICD-10-CM

## 2020-11-10 PROCEDURE — U0004 COV-19 TEST NON-CDC HGH THRU: HCPCS | Performed by: RADIOLOGY

## 2020-11-10 PROCEDURE — C9803 HOPD COVID-19 SPEC COLLECT: HCPCS

## 2020-11-11 LAB — SARS-COV-2 RNA RESP QL NAA+PROBE: NOT DETECTED

## 2020-11-12 ENCOUNTER — HOSPITAL ENCOUNTER (OUTPATIENT)
Dept: ULTRASOUND IMAGING | Facility: HOSPITAL | Age: 45
Discharge: HOME | End: 2020-11-12
Admitting: OTOLARYNGOLOGY

## 2020-11-12 VITALS
OXYGEN SATURATION: 99 % | SYSTOLIC BLOOD PRESSURE: 118 MMHG | WEIGHT: 215 LBS | HEIGHT: 64 IN | BODY MASS INDEX: 36.7 KG/M2 | RESPIRATION RATE: 16 BRPM | DIASTOLIC BLOOD PRESSURE: 80 MMHG | HEART RATE: 64 BPM

## 2020-11-12 DIAGNOSIS — E04.1 THYROID NODULE: ICD-10-CM

## 2020-11-12 PROCEDURE — 76942 ECHO GUIDE FOR BIOPSY: CPT

## 2020-11-12 PROCEDURE — 10005 FNA BX W/US GDN 1ST LES: CPT | Performed by: RADIOLOGY

## 2020-11-12 PROCEDURE — 88173 CYTOPATH EVAL FNA REPORT: CPT | Performed by: RADIOLOGY

## 2020-11-12 PROCEDURE — 88305 TISSUE EXAM BY PATHOLOGIST: CPT | Performed by: RADIOLOGY

## 2020-11-13 LAB — LAB AP CASE REPORT: NORMAL

## 2021-04-09 ENCOUNTER — TRANSCRIBE ORDERS (OUTPATIENT)
Dept: ADMINISTRATIVE | Facility: HOSPITAL | Age: 46
End: 2021-04-09

## 2021-04-09 DIAGNOSIS — D44.0 TERATOMA OF UNCERTAIN BEHAVIOR OF THYROID GLAND: Primary | ICD-10-CM

## 2021-04-15 ENCOUNTER — APPOINTMENT (OUTPATIENT)
Dept: ULTRASOUND IMAGING | Facility: HOSPITAL | Age: 46
End: 2021-04-15

## 2021-04-16 ENCOUNTER — HOSPITAL ENCOUNTER (OUTPATIENT)
Dept: ULTRASOUND IMAGING | Facility: HOSPITAL | Age: 46
Discharge: HOME | End: 2021-04-16
Admitting: OTOLARYNGOLOGY

## 2021-04-16 DIAGNOSIS — D44.0 TERATOMA OF UNCERTAIN BEHAVIOR OF THYROID GLAND: ICD-10-CM

## 2021-04-16 PROCEDURE — 76536 US EXAM OF HEAD AND NECK: CPT | Performed by: RADIOLOGY

## 2021-04-16 PROCEDURE — 76536 US EXAM OF HEAD AND NECK: CPT

## 2021-06-25 ENCOUNTER — HOSPITAL ENCOUNTER (OUTPATIENT)
Dept: ULTRASOUND IMAGING | Facility: HOSPITAL | Age: 46
Discharge: HOME | End: 2021-06-25
Admitting: OTOLARYNGOLOGY

## 2021-06-25 VITALS
OXYGEN SATURATION: 96 % | SYSTOLIC BLOOD PRESSURE: 134 MMHG | DIASTOLIC BLOOD PRESSURE: 86 MMHG | RESPIRATION RATE: 16 BRPM | BODY MASS INDEX: 35 KG/M2 | HEART RATE: 69 BPM | HEIGHT: 64 IN | WEIGHT: 205 LBS

## 2021-06-25 DIAGNOSIS — E04.1 THYROID NODULE: ICD-10-CM

## 2021-06-25 PROCEDURE — 88172 CYTP DX EVAL FNA 1ST EA SITE: CPT | Performed by: OTOLARYNGOLOGY

## 2021-06-25 PROCEDURE — 10005 FNA BX W/US GDN 1ST LES: CPT | Performed by: RADIOLOGY

## 2021-06-25 PROCEDURE — 76942 ECHO GUIDE FOR BIOPSY: CPT

## 2021-06-25 PROCEDURE — 88305 TISSUE EXAM BY PATHOLOGIST: CPT | Performed by: OTOLARYNGOLOGY

## 2021-06-25 PROCEDURE — 88173 CYTOPATH EVAL FNA REPORT: CPT | Performed by: OTOLARYNGOLOGY

## 2021-06-29 LAB — LAB AP CASE REPORT: NORMAL

## 2022-02-15 ENCOUNTER — PREP FOR SURGERY (OUTPATIENT)
Dept: OTHER | Facility: HOSPITAL | Age: 47
End: 2022-02-15

## 2022-02-15 ENCOUNTER — TRANSCRIBE ORDERS (OUTPATIENT)
Dept: ADMINISTRATIVE | Facility: HOSPITAL | Age: 47
End: 2022-02-15

## 2022-02-15 DIAGNOSIS — N93.9 ABNORMAL UTERINE BLEEDING (AUB): Primary | ICD-10-CM

## 2022-02-15 DIAGNOSIS — R10.2 PELVIC PAIN: ICD-10-CM

## 2022-02-15 DIAGNOSIS — Z01.818 PRE-OPERATIVE CLEARANCE: Primary | ICD-10-CM

## 2022-02-15 DIAGNOSIS — N85.9 FLUID IN ENDOMETRIAL CAVITY: ICD-10-CM

## 2022-02-15 RX ORDER — SODIUM CHLORIDE 0.9 % (FLUSH) 0.9 %
3 SYRINGE (ML) INJECTION EVERY 12 HOURS SCHEDULED
Status: CANCELLED | OUTPATIENT
Start: 2022-02-15

## 2022-02-15 RX ORDER — SODIUM CHLORIDE 0.9 % (FLUSH) 0.9 %
10 SYRINGE (ML) INJECTION AS NEEDED
Status: CANCELLED | OUTPATIENT
Start: 2022-02-15

## 2022-02-15 NOTE — H&P
This 46 year old female presents for GYN US.    History of Present Illness  1.  GYN US   2.  Provider HPI   Pt presents for US follow-up.   Pt is s/p endometrial ablation/BTL > 10 years ago. Pt had bleeding over the past year - last 10/2021. Has had pelvic pain since endometrial ablation, but worsening in the past 3 years. Of note, she used DMPA after ablation which helped with pain.   PMH notable for epilepsy w/ vagal nerve stimulator, benign thyroid mass.   PSH notable for partial thyroidectomy, ablation/BTL, vagal nerve stimulator placement.   G0.   Denies STDs, not sexually active.   No hx abnormal pap smear - pap UTD.          Gynecologic History  02/11/2022 11:20 AM  Date of last Pap: 07/07/2021.  Past Systemic History    Medical History  (Reviewed,updated)  Disease Onset Date Comments   Epilepsy     Seizure disorder     History of endometrial ablation     Mental retardation     Schizophrenia         Surgical History/Management  (Reviewed,updated)  Management Date Comments   Vagus Implant Stimulator 03/31/2014    Bipolar     Goiter     Tonsillectomy     Bilateral tubal ligation       Diagnostics History  Status Study Ordered Completed Interpretation  Result / Report   completed EKG 08/20/2020 08/20/2020   see EKG tracing   completed MAMMO SCREENING BI, (2 VIEWS) INCL CAD  11/02/2020      completed MAMMO SCREENING BI, (2 VIEWS) INCL CAD 08/20/2020 11/02/2020   D)S 11/02/2020   obtained PAP, liquid based 07/07/2021       ordered TRANSVAGINAL US, NON-OB 01/11/2022       result received US EXAM, PELVIC, COMPLETE 01/11/2022 01/11/2022 See module     result received US EXAM, PELVIC, COMPLETE 02/11/2022 02/11/2022 See module     completed X-RAY C-SPINE 2-3 VW 08/02/2021 08/02/2021   see xray ID#[id number]   completed X-RAY HIP UNI 1 VIEW 08/02/2021 08/02/2021   see xray ID#[id number]   completed X-RAY L-SPINE 2-3 VW 08/02/2021 08/02/2021   see xray ID#[id number]   completed X-RAY THORACIC SPINE 2VW 08/02/2021 08/02/2021   see  xray ID#[id number]       Problem List  Problem List reviewed.   Problem Description Onset Date Chronic Clinical Status Notes   Anxiety  Y     Seizure disorder  N     No active problems      Medications (active prior to today)  Medication Name Sig Description Start Date Stop Date Refilled Rx Elsewhere   Onfi 20 mg tablet take 1 tablet by oral route 2 times every day // 05/22/2020 Y   escitalopram 10 mg tablet take 1 tablet by oral route  every day // 05/22/2020 Y   ziprasidone 40 mg capsule take 1 capsule by oral route  every day at noon with food // 05/22/2020 Y   ziprasidone 80 mg capsule take 1 capsule by oral route  every day at bedtime with food // 05/22/2020 Y   Lamictal 150 mg tablet take 1 tablet by oral route 2 times every day // 05/22/2020 Y   melatonin 3 mg capsule take at bedtime 05/22/2020 05/22/2020 N   Vimpat 200 mg tablet take 1 tablet by oral route 2 times every day // 05/22/2020 Y   Nayzilam 5 mg/spray (0.1 mL) nasal spray spray 1 spray by intranasal route into one nostril once may repeat dose in other nostril after 10 minutes if inadequate response //   Y   Vision Formula (with lutein) 1,000 unit-200 mg-60 unit-2mg tablet  //   Y   latanoprost 0.005 % eye drops instill 1 drop by ophthalmic route  every day into affected eye(s) in the evening //   Y   Vitamin D2 1,250 mcg (50,000 unit) capsule take 1 capsule by oral route  every week 04/20/2021 04/20/2021 N   EpiPen 0.3 mg/0.3 mL injection, auto-injector inject 0.3 milliliter by intramuscular route once as needed for anaphylaxis 08/02/2021 08/02/2021 N   omeprazole 40 mg capsule,delayed release take 1 capsule by oral route  every day before a meal 09/02/2021 09/02/2021 N   atorvastatin 10 mg tablet take 1 tablet by oral route  every bedtime 09/02/2021 09/02/2021 N   Zyrtec 10 mg tablet take 1 tablet by oral route  every day 09/02/2021 09/02/2021 N   terbinafine HCl 250 mg tablet take 1 tablet by oral route  every day // 02/11/2022  Y   Pepcid  20 mg tablet take 1 tablet by oral route  every day 12/02/2021 12/02/2021 N   alprazolam 1 mg tablet take 1 tablet by oral route  every day 12/02/2021 12/02/2021 N   Briviact 10 mg tablet take 1 tablet by oral route 2 times every day //   Y     Patient Status   Completed with information received for patient transitioning into care.   Completed with information received for patient in a summary of care record.     Medication Reconciliation  Medications reconciled today.    Medication Reviewed  Adherence Medication Name Sig Desc Elsewhere Status   taking as directed Onfi 20 mg tablet take 1 tablet by oral route 2 times every day Y Verified   taking as directed Vimpat 200 mg tablet take 1 tablet by oral route 2 times every day Y Verified   taking as directed ziprasidone 80 mg capsule take 1 capsule by oral route  every day at bedtime with food Y Verified   taking as directed Lamictal 150 mg tablet take 1 tablet by oral route 2 times every day Y Verified   taking as directed escitalopram 10 mg tablet take 1 tablet by oral route  every day Y Verified   taking as directed ziprasidone 40 mg capsule take 1 capsule by oral route  every day at noon with food Y Verified   taking as directed melatonin 3 mg capsule take at bedtime N Verified   taking as directed Vitamin D2 1,250 mcg (50,000 unit) capsule take 1 capsule by oral route  every week N Verified   taking as directed Vision Formula (with lutein) 1,000 unit-200 mg-60 unit-2mg tablet  Y Verified   taking as directed Nayzilam 5 mg/spray (0.1 mL) nasal spray spray 1 spray by intranasal route into one nostril once may repeat dose in other nostril after 10 minutes if inadequate response Y Verified   taking as directed latanoprost 0.005 % eye drops instill 1 drop by ophthalmic route  every day into affected eye(s) in the evening Y Verified   taking as directed EpiPen 0.3 mg/0.3 mL injection, auto-injector inject 0.3 milliliter by intramuscular route once as needed for  anaphylaxis N Verified   taking as directed Pepcid 20 mg tablet take 1 tablet by oral route  every day N Verified   taking as directed omeprazole 40 mg capsule,delayed release take 1 capsule by oral route  every day before a meal N Verified   taking as directed atorvastatin 10 mg tablet take 1 tablet by oral route  every bedtime N Verified   taking as directed alprazolam 1 mg tablet take 1 tablet by oral route  every day N Verified   taking as directed Zyrtec 10 mg tablet take 1 tablet by oral route  every day N Verified   taking as directed terbinafine HCl 250 mg tablet take 1 tablet by oral route  every day Y Verified     Allergies  Ingredient Reaction (Severity) Medication Name Comment   BEE VENOM PROTEIN (HONEY BEE) Anaphylaxis           Family History    (Reviewed, updated)  Relationship Family Member Name  Age at Death Condition Onset Age Cause of Death   Father  Y  Depression  N   Mother    Degenerative Joint Disease  N   Mother    Macular Degeneration  N   Mother    COPD  N   Mother    Arthritis  N     Family History Comments  Relationship Family Member Name Condition Comments   Father  Depression  AF 2018 -Cause of Death   M    Social History  Reviewed, no changes. Last detailed document date: 2022.        Vital Signs   Time BP mm/Hg Pulse /min Resp /min Temp F Ht ft Ht in Ht cm Wt lb Wt kg BMI kg/m2 BSA m2 O2 Sat%   11:13 /76 71 18 97.5 5 4 162.56 192 87.09 32.96 1.98 96     Measured by  Time Measured by   11:13 AM Priti Leon         Screening Summary  The following were reviewed: tobacco use, alcohol use, caffeine use, drugs of abuse and date of last pap        Physical Exam  Exam Findings Details   Constitutional Normal Well developed.   Respiratory Normal Inspection - Normal.   Genitourinary * Pelvic deferred.   Skin Normal Inspection - Normal.   Psychiatric Normal Appropriate mood and affect.       Completed Orders (This Visit)  Order Details Reason Side Interpretation  Result Initial Treatment Date Region   Patient Health Questionnaire (PHQ-2)    Further testing is not required 0     Prescribed activity/exercise education          Dietary management education, guidance, and counseling          Pre-Visit Planning            Assessment/Plan  # Detail Type Description    1. Assessment Abnormal uterine bleeding (AUB) (N93.9).    Plan Orders The patient had the following order(s) completed today: US EXAM, PELVIC, COMPLETE. Obtained on 02/11/2022, Interpretation: See module.         2. Assessment Pelvic pain in female (R10.2).    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 hematometra. Discussed D&C for further evaluation vs. attempt at office EMB vs. US surveillance. Pt/mother elect for HSC/D&C - will schedule for 2/21/22..         3. Assessment Fluid in endometrial cavity (N85.9).    Plan Orders She will be scheduled for D&C, DIAGNOSTIC, Next Lab Date is on 02/21/2022. Clinical information/comments: The surgeon scheduled is Ella Leal MD.         4. Assessment Dietary counseling and surveillance (Z71.3).    Plan Orders Today's instructions / counseling include(s) Dietary management education, guidance, and counseling.Prescribed activity/exercise education            Diagnostic History Entered Today  Performed Study Interpretation Result   02/11/2022 Pre-Visit Planning         Clinical Guidelines (Reviewed, updated)  Guidelines Status Due Action Last Addressed   Chlamydia Screening completed 07/08/2022 Completed on 07/08/2021 07/08/2021   Controlled Med Agreement completed 05/20/2022 Completed on 05/20/2021 05/20/2021   Depression screening completed 02/11/2023 Completed on 02/11/2022 02/11/2022   HPV completed 07/08/2026 Completed on 07/08/2021 07/08/2021   Lipid panel completed 10/01/2026 Completed on 10/01/2021 10/01/2021   PAP completed 07/08/2024 Completed on 07/08/2021 07/08/2021   Pap/HPV testing completed 07/08/2026 Completed on 07/08/2021 07/08/2021    Pre-Visit Planning completed 02/18/2022 Completed on 02/11/2022 02/11/2022       Patient Education  # Patient Education   1. Electrophysiology Study and Catheter Ablation: Before Your Procedure       Medications (Added, Continued or Stopped today)  Start Date Medication Directions PRN Status PRN Reason Instruction Stop Date   12/02/2021 alprazolam 1 mg tablet take 1 tablet by oral route  every day N  Do not refill until 4/26/21 09/02/2021 atorvastatin 10 mg tablet take 1 tablet by oral route  every bedtime N       Briviact 10 mg tablet take 1 tablet by oral route 2 times every day N      08/02/2021 EpiPen 0.3 mg/0.3 mL injection, auto-injector inject 0.3 milliliter by intramuscular route once as needed for anaphylaxis N       escitalopram 10 mg tablet take 1 tablet by oral route  every day N       Lamictal 150 mg tablet take 1 tablet by oral route 2 times every day N       latanoprost 0.005 % eye drops instill 1 drop by ophthalmic route  every day into affected eye(s) in the evening N      05/22/2020 melatonin 3 mg capsule take at bedtime N       Nayzilam 5 mg/spray (0.1 mL) nasal spray spray 1 spray by intranasal route into one nostril once may repeat dose in other nostril after 10 minutes if inadequate response N      09/02/2021 omeprazole 40 mg capsule,delayed release take 1 capsule by oral route  every day before a meal N       Onfi 20 mg tablet take 1 tablet by oral route 2 times every day N      12/02/2021 Pepcid 20 mg tablet take 1 tablet by oral route  every day N       terbinafine HCl 250 mg tablet take 1 tablet by oral route  every day N   02/11/2022    Vimpat 200 mg tablet take 1 tablet by oral route 2 times every day N       Vision Formula (with lutein) 1,000 unit-200 mg-60 unit-2mg tablet  N      04/20/2021 Vitamin D2 1,250 mcg (50,000 unit) capsule take 1 capsule by oral route  every week N       ziprasidone 40 mg capsule take 1 capsule by oral route  every day at noon with food N        ziprasidone 80 mg capsule take 1 capsule by oral route  every day at bedtime with food N      09/02/2021 Zyrtec 10 mg tablet take 1 tablet by oral route  every day N          To Be Scheduled / Ordered  Status Order Reason Assessment Timeframe Appointment   ordered D&C, DIAGNOSTIC  N85.9  02/21/2022     Surgery Scheduled  Surgery Details #1:  The patient has a diagnosis of: Fluid in endometrial cavity, N85.9  She is scheduled for: D&C, DIAGNOSTIC  The surgery is scheduled for 02/21/2022, to be performed by Ella Leal MD  Time Needed:      Additional Details:  Patient's Last BMI: 32.96       Orders/Procedures/Instructions/Educations  Office Procedures/Services  D&C, DIAGNOSTIC     Diagnostics/Procedures To Be Scheduled  Order Timeframe   US EXAM, PELVIC, COMPLETE        Counseling / Educational Factors  Counseling / educational factors reviewed.

## 2022-02-18 ENCOUNTER — LAB (OUTPATIENT)
Dept: LAB | Facility: HOSPITAL | Age: 47
End: 2022-02-18

## 2022-02-18 ENCOUNTER — PRE-ADMISSION TESTING (OUTPATIENT)
Dept: PREADMISSION TESTING | Facility: HOSPITAL | Age: 47
End: 2022-02-18

## 2022-02-18 VITALS — HEIGHT: 64 IN | WEIGHT: 185 LBS | BODY MASS INDEX: 31.58 KG/M2

## 2022-02-18 DIAGNOSIS — Z01.818 PRE-OPERATIVE CLEARANCE: ICD-10-CM

## 2022-02-18 DIAGNOSIS — N93.9 ABNORMAL UTERINE BLEEDING (AUB): ICD-10-CM

## 2022-02-18 DIAGNOSIS — R10.2 PELVIC PAIN: ICD-10-CM

## 2022-02-18 DIAGNOSIS — N85.9 FLUID IN ENDOMETRIAL CAVITY: ICD-10-CM

## 2022-02-18 LAB
ABO GROUP BLD: NORMAL
ANION GAP SERPL CALCULATED.3IONS-SCNC: 9.2 MMOL/L (ref 5–15)
BASOPHILS # BLD AUTO: 0.03 10*3/MM3 (ref 0–0.2)
BASOPHILS NFR BLD AUTO: 0.5 % (ref 0–1.5)
BLD GP AB SCN SERPL QL: NEGATIVE
BUN SERPL-MCNC: 10 MG/DL (ref 6–20)
BUN/CREAT SERPL: 13.7 (ref 7–25)
CALCIUM SPEC-SCNC: 8.8 MG/DL (ref 8.6–10.5)
CHLORIDE SERPL-SCNC: 106 MMOL/L (ref 98–107)
CO2 SERPL-SCNC: 25.8 MMOL/L (ref 22–29)
CREAT SERPL-MCNC: 0.73 MG/DL (ref 0.57–1)
DEPRECATED RDW RBC AUTO: 46.5 FL (ref 37–54)
EOSINOPHIL # BLD AUTO: 0.21 10*3/MM3 (ref 0–0.4)
EOSINOPHIL NFR BLD AUTO: 3.7 % (ref 0.3–6.2)
ERYTHROCYTE [DISTWIDTH] IN BLOOD BY AUTOMATED COUNT: 13.1 % (ref 12.3–15.4)
GFR SERPL CREATININE-BSD FRML MDRD: 85 ML/MIN/1.73
GLUCOSE SERPL-MCNC: 97 MG/DL (ref 65–99)
HCT VFR BLD AUTO: 38 % (ref 34–46.6)
HGB BLD-MCNC: 12.3 G/DL (ref 12–15.9)
IMM GRANULOCYTES # BLD AUTO: 0.01 10*3/MM3 (ref 0–0.05)
IMM GRANULOCYTES NFR BLD AUTO: 0.2 % (ref 0–0.5)
LYMPHOCYTES # BLD AUTO: 1.36 10*3/MM3 (ref 0.7–3.1)
LYMPHOCYTES NFR BLD AUTO: 23.9 % (ref 19.6–45.3)
MCH RBC QN AUTO: 31.2 PG (ref 26.6–33)
MCHC RBC AUTO-ENTMCNC: 32.4 G/DL (ref 31.5–35.7)
MCV RBC AUTO: 96.4 FL (ref 79–97)
MONOCYTES # BLD AUTO: 0.4 10*3/MM3 (ref 0.1–0.9)
MONOCYTES NFR BLD AUTO: 7 % (ref 5–12)
NEUTROPHILS NFR BLD AUTO: 3.68 10*3/MM3 (ref 1.7–7)
NEUTROPHILS NFR BLD AUTO: 64.7 % (ref 42.7–76)
NRBC BLD AUTO-RTO: 0 /100 WBC (ref 0–0.2)
PLATELET # BLD AUTO: 251 10*3/MM3 (ref 140–450)
PMV BLD AUTO: 9.9 FL (ref 6–12)
POTASSIUM SERPL-SCNC: 4.1 MMOL/L (ref 3.5–5.2)
RBC # BLD AUTO: 3.94 10*6/MM3 (ref 3.77–5.28)
RH BLD: POSITIVE
SODIUM SERPL-SCNC: 141 MMOL/L (ref 136–145)
T&S EXPIRATION DATE: NORMAL
WBC NRBC COR # BLD: 5.69 10*3/MM3 (ref 3.4–10.8)

## 2022-02-18 PROCEDURE — 86850 RBC ANTIBODY SCREEN: CPT

## 2022-02-18 PROCEDURE — 85025 COMPLETE CBC W/AUTO DIFF WBC: CPT

## 2022-02-18 PROCEDURE — U0004 COV-19 TEST NON-CDC HGH THRU: HCPCS | Performed by: OBSTETRICS & GYNECOLOGY

## 2022-02-18 PROCEDURE — 86900 BLOOD TYPING SEROLOGIC ABO: CPT

## 2022-02-18 PROCEDURE — 36415 COLL VENOUS BLD VENIPUNCTURE: CPT

## 2022-02-18 PROCEDURE — 86901 BLOOD TYPING SEROLOGIC RH(D): CPT

## 2022-02-18 PROCEDURE — 80048 BASIC METABOLIC PNL TOTAL CA: CPT

## 2022-02-18 RX ORDER — FAMOTIDINE 20 MG/1
20 TABLET, FILM COATED ORAL EVERY OTHER DAY
COMMUNITY

## 2022-02-18 RX ORDER — LATANOPROST 50 UG/ML
1 SOLUTION/ DROPS OPHTHALMIC NIGHTLY
COMMUNITY

## 2022-02-18 RX ORDER — DOCUSATE SODIUM 100 MG/1
100 CAPSULE, LIQUID FILLED ORAL DAILY
COMMUNITY

## 2022-02-18 NOTE — DISCHARGE INSTRUCTIONS
TAKE the following medications the morning of surgery:  2/21/2022  Arrival time per MD     All heart or blood pressure medications    Please discontinue all blood thinners and anticoagulants (except aspirin) prior to surgery as per your surgeon and cardiologist instructions.  Aspirin may be continued up to the day prior to surgery.    HOLD all diabetic medications the morning of surgery as order by physician.    Please follow instructions on use of prep cloths provided by nurse. Return instruction sheet to pre-op nurse on day of surgery.    A RESPONSIBLE PERSON MUST REMAIN IN THE WAITING ROOM DURING YOUR PROCEDURE AND A RESPONSIBLE  MUST BE AVAILABLE UPON YOUR DISCHARGE.      General Instructions:  2/20/22  • Do NOT eat or drink after midnight which includes water, mints, or gum.  • You may brush your teeth. Dental appliances that are removable must be taken out day of surgery.  • Do NOT smoke, chew tobacco, or drink alcohol within 24 hours prior to surgery.  • Bring medications in original bottles, any inhalers and if applicable your C-PAP/BI-PAP machine  • Bring any papers given to you in the doctor’s office  • Wear clean, comfortable clothes and socks  • Do NOT wear contact lenses or make-up or dark nail polish.  Bring a case for your glasses if applicable.  • Bring crutches or walker if applicable  • Leave all other valuables and jewelry at home  • If you were given a blood bank armband, continue to wear it until discharged.    Preventing a Surgical Site Infection:  • Shower the night before surgery (unless instructed otherwise) using a fresh bar of anti-bacterial soap (such as Dial) and clean washcloth.  Dry with a clean towel and dress in clean clothing.  • For 2 to 3 days before surgery, avoid shaving with a razor near where you will have surgery because the razor can irritate skin and make it easier to develop an infection.  Ask your surgeon if you will be receiving antibiotics prior to  surgery.  • Make sure you, your family, and all healthcare providers clean their hands with soap and water or an alcohol-based hand  before caring for you or your wound.  • If at all possible, quit smoking as many days before surgery as you can.    Day of Surgery:  Upon arrival, a pre-op nurse and anesthesiologist will review your health history, obtain vital signs, and answer questions you may have.  The only belongings needed at this time will be your home medications and if applicable you C-PAP/BI-PAP machine.  If you are staying overnight, your family can leave the rest of your belongings in the car and bring them to your room later.  A pre-op nurse will start an IV and you may receive medication in preparation for surgery.  Due to patient privacy and limited space, only one member of your family will be able to accompany you in the pre-op area.  While you are in surgery your family should notify the waiting room  if they leave the waiting room area and provide a contact number.  Please be aware that surgery does come with discomfort.  We want to make every effort to control your discomfort so please discuss any uncontrolled symptoms with your nurse.  Your doctor will most likely have prescribed pain medications.  If you are going home after surgery you will receive individualized written care instructions before being discharged.  A responsible adult must drive you to and from the hospital on the day of surgery and stay with you for 24 hours.  If you are staying overnight following surgery, you will be transported to your hospital room following the recovery period.

## 2022-02-19 LAB — SARS-COV-2 RNA PNL SPEC NAA+PROBE: NOT DETECTED

## 2022-02-21 ENCOUNTER — ANESTHESIA (OUTPATIENT)
Dept: PERIOP | Facility: HOSPITAL | Age: 47
End: 2022-02-21

## 2022-02-21 ENCOUNTER — HOSPITAL ENCOUNTER (OUTPATIENT)
Facility: HOSPITAL | Age: 47
Setting detail: HOSPITAL OUTPATIENT SURGERY
Discharge: HOME | End: 2022-02-21
Attending: OBSTETRICS & GYNECOLOGY | Admitting: OBSTETRICS & GYNECOLOGY

## 2022-02-21 ENCOUNTER — APPOINTMENT (OUTPATIENT)
Dept: GENERAL RADIOLOGY | Facility: HOSPITAL | Age: 47
End: 2022-02-21

## 2022-02-21 ENCOUNTER — ANESTHESIA EVENT (OUTPATIENT)
Dept: PERIOP | Facility: HOSPITAL | Age: 47
End: 2022-02-21

## 2022-02-21 VITALS
OXYGEN SATURATION: 97 % | SYSTOLIC BLOOD PRESSURE: 115 MMHG | DIASTOLIC BLOOD PRESSURE: 71 MMHG | HEART RATE: 78 BPM | WEIGHT: 186.2 LBS | RESPIRATION RATE: 18 BRPM | BODY MASS INDEX: 31.79 KG/M2 | TEMPERATURE: 97.4 F | HEIGHT: 64 IN

## 2022-02-21 DIAGNOSIS — R10.2 PELVIC PAIN: ICD-10-CM

## 2022-02-21 DIAGNOSIS — N93.9 ABNORMAL UTERINE BLEEDING (AUB): ICD-10-CM

## 2022-02-21 DIAGNOSIS — N85.9 FLUID IN ENDOMETRIAL CAVITY: ICD-10-CM

## 2022-02-21 LAB
ABO GROUP BLD: NORMAL
B-HCG UR QL: NEGATIVE
EXPIRATION DATE: NORMAL
INTERNAL NEGATIVE CONTROL: NEGATIVE
INTERNAL POSITIVE CONTROL: POSITIVE
Lab: NORMAL
RH BLD: POSITIVE

## 2022-02-21 PROCEDURE — 81025 URINE PREGNANCY TEST: CPT | Performed by: ANESTHESIOLOGY

## 2022-02-21 PROCEDURE — 25010000002 FENTANYL CITRATE (PF) 50 MCG/ML SOLUTION: Performed by: NURSE ANESTHETIST, CERTIFIED REGISTERED

## 2022-02-21 PROCEDURE — 25010000002 KETOROLAC TROMETHAMINE PER 15 MG: Performed by: NURSE ANESTHETIST, CERTIFIED REGISTERED

## 2022-02-21 PROCEDURE — 25010000002 MIDAZOLAM PER 1 MG: Performed by: NURSE ANESTHETIST, CERTIFIED REGISTERED

## 2022-02-21 PROCEDURE — 86901 BLOOD TYPING SEROLOGIC RH(D): CPT

## 2022-02-21 PROCEDURE — 88305 TISSUE EXAM BY PATHOLOGIST: CPT | Performed by: OBSTETRICS & GYNECOLOGY

## 2022-02-21 PROCEDURE — 86900 BLOOD TYPING SEROLOGIC ABO: CPT

## 2022-02-21 PROCEDURE — 25010000002 ONDANSETRON PER 1 MG: Performed by: NURSE ANESTHETIST, CERTIFIED REGISTERED

## 2022-02-21 PROCEDURE — 25010000002 CEFOXITIN PER 1 G: Performed by: NURSE PRACTITIONER

## 2022-02-21 PROCEDURE — 25010000002 PROPOFOL 10 MG/ML EMULSION: Performed by: NURSE ANESTHETIST, CERTIFIED REGISTERED

## 2022-02-21 RX ORDER — SODIUM CHLORIDE 0.9 % (FLUSH) 0.9 %
10 SYRINGE (ML) INJECTION AS NEEDED
Status: DISCONTINUED | OUTPATIENT
Start: 2022-02-21 | End: 2022-02-21 | Stop reason: HOSPADM

## 2022-02-21 RX ORDER — IPRATROPIUM BROMIDE AND ALBUTEROL SULFATE 2.5; .5 MG/3ML; MG/3ML
3 SOLUTION RESPIRATORY (INHALATION) ONCE AS NEEDED
Status: DISCONTINUED | OUTPATIENT
Start: 2022-02-21 | End: 2022-02-21 | Stop reason: HOSPADM

## 2022-02-21 RX ORDER — LIDOCAINE HYDROCHLORIDE 20 MG/ML
INJECTION, SOLUTION INFILTRATION; PERINEURAL AS NEEDED
Status: DISCONTINUED | OUTPATIENT
Start: 2022-02-21 | End: 2022-02-21 | Stop reason: SURG

## 2022-02-21 RX ORDER — SODIUM CHLORIDE 0.9 % (FLUSH) 0.9 %
3 SYRINGE (ML) INJECTION EVERY 12 HOURS SCHEDULED
Status: DISCONTINUED | OUTPATIENT
Start: 2022-02-21 | End: 2022-02-21 | Stop reason: HOSPADM

## 2022-02-21 RX ORDER — MIDAZOLAM HYDROCHLORIDE 1 MG/ML
INJECTION INTRAMUSCULAR; INTRAVENOUS AS NEEDED
Status: DISCONTINUED | OUTPATIENT
Start: 2022-02-21 | End: 2022-02-21 | Stop reason: SURG

## 2022-02-21 RX ORDER — KETOROLAC TROMETHAMINE 30 MG/ML
30 INJECTION, SOLUTION INTRAMUSCULAR; INTRAVENOUS EVERY 6 HOURS PRN
Status: COMPLETED | OUTPATIENT
Start: 2022-02-21 | End: 2022-02-21

## 2022-02-21 RX ORDER — PROPOFOL 10 MG/ML
VIAL (ML) INTRAVENOUS AS NEEDED
Status: DISCONTINUED | OUTPATIENT
Start: 2022-02-21 | End: 2022-02-21 | Stop reason: SURG

## 2022-02-21 RX ORDER — FENTANYL CITRATE 50 UG/ML
50 INJECTION, SOLUTION INTRAMUSCULAR; INTRAVENOUS
Status: DISCONTINUED | OUTPATIENT
Start: 2022-02-21 | End: 2022-02-21 | Stop reason: HOSPADM

## 2022-02-21 RX ORDER — FENTANYL CITRATE 50 UG/ML
INJECTION, SOLUTION INTRAMUSCULAR; INTRAVENOUS AS NEEDED
Status: DISCONTINUED | OUTPATIENT
Start: 2022-02-21 | End: 2022-02-21 | Stop reason: SURG

## 2022-02-21 RX ORDER — DROPERIDOL 2.5 MG/ML
0.62 INJECTION, SOLUTION INTRAMUSCULAR; INTRAVENOUS ONCE AS NEEDED
Status: DISCONTINUED | OUTPATIENT
Start: 2022-02-21 | End: 2022-02-21 | Stop reason: HOSPADM

## 2022-02-21 RX ORDER — SODIUM CHLORIDE, SODIUM LACTATE, POTASSIUM CHLORIDE, CALCIUM CHLORIDE 600; 310; 30; 20 MG/100ML; MG/100ML; MG/100ML; MG/100ML
100 INJECTION, SOLUTION INTRAVENOUS ONCE AS NEEDED
Status: DISCONTINUED | OUTPATIENT
Start: 2022-02-21 | End: 2022-02-21 | Stop reason: HOSPADM

## 2022-02-21 RX ORDER — MAGNESIUM HYDROXIDE 1200 MG/15ML
LIQUID ORAL AS NEEDED
Status: DISCONTINUED | OUTPATIENT
Start: 2022-02-21 | End: 2022-02-21 | Stop reason: HOSPADM

## 2022-02-21 RX ORDER — FAMOTIDINE 10 MG/ML
INJECTION, SOLUTION INTRAVENOUS AS NEEDED
Status: DISCONTINUED | OUTPATIENT
Start: 2022-02-21 | End: 2022-02-21 | Stop reason: SURG

## 2022-02-21 RX ORDER — SODIUM CHLORIDE 0.9 % (FLUSH) 0.9 %
10 SYRINGE (ML) INJECTION EVERY 12 HOURS SCHEDULED
Status: DISCONTINUED | OUTPATIENT
Start: 2022-02-21 | End: 2022-02-21 | Stop reason: HOSPADM

## 2022-02-21 RX ORDER — SODIUM CHLORIDE, SODIUM LACTATE, POTASSIUM CHLORIDE, CALCIUM CHLORIDE 600; 310; 30; 20 MG/100ML; MG/100ML; MG/100ML; MG/100ML
INJECTION, SOLUTION INTRAVENOUS CONTINUOUS PRN
Status: DISCONTINUED | OUTPATIENT
Start: 2022-02-21 | End: 2022-02-21 | Stop reason: SURG

## 2022-02-21 RX ORDER — SODIUM CHLORIDE, SODIUM LACTATE, POTASSIUM CHLORIDE, CALCIUM CHLORIDE 600; 310; 30; 20 MG/100ML; MG/100ML; MG/100ML; MG/100ML
125 INJECTION, SOLUTION INTRAVENOUS ONCE
Status: COMPLETED | OUTPATIENT
Start: 2022-02-21 | End: 2022-02-21

## 2022-02-21 RX ORDER — IBUPROFEN 600 MG/1
600 TABLET ORAL EVERY 6 HOURS
Qty: 30 TABLET | Refills: 0 | Status: SHIPPED | OUTPATIENT
Start: 2022-02-21

## 2022-02-21 RX ORDER — MEPERIDINE HYDROCHLORIDE 25 MG/ML
12.5 INJECTION INTRAMUSCULAR; INTRAVENOUS; SUBCUTANEOUS
Status: DISCONTINUED | OUTPATIENT
Start: 2022-02-21 | End: 2022-02-21 | Stop reason: HOSPADM

## 2022-02-21 RX ORDER — ONDANSETRON 2 MG/ML
4 INJECTION INTRAMUSCULAR; INTRAVENOUS AS NEEDED
Status: DISCONTINUED | OUTPATIENT
Start: 2022-02-21 | End: 2022-02-21 | Stop reason: HOSPADM

## 2022-02-21 RX ORDER — LIDOCAINE HYDROCHLORIDE AND EPINEPHRINE 10; 10 MG/ML; UG/ML
INJECTION, SOLUTION INFILTRATION; PERINEURAL AS NEEDED
Status: DISCONTINUED | OUTPATIENT
Start: 2022-02-21 | End: 2022-02-21 | Stop reason: HOSPADM

## 2022-02-21 RX ORDER — ONDANSETRON 2 MG/ML
INJECTION INTRAMUSCULAR; INTRAVENOUS AS NEEDED
Status: DISCONTINUED | OUTPATIENT
Start: 2022-02-21 | End: 2022-02-21 | Stop reason: SURG

## 2022-02-21 RX ORDER — MIDAZOLAM HYDROCHLORIDE 1 MG/ML
1 INJECTION INTRAMUSCULAR; INTRAVENOUS
Status: DISCONTINUED | OUTPATIENT
Start: 2022-02-21 | End: 2022-02-21 | Stop reason: HOSPADM

## 2022-02-21 RX ORDER — OXYCODONE HYDROCHLORIDE AND ACETAMINOPHEN 5; 325 MG/1; MG/1
1 TABLET ORAL ONCE AS NEEDED
Status: DISCONTINUED | OUTPATIENT
Start: 2022-02-21 | End: 2022-02-21 | Stop reason: HOSPADM

## 2022-02-21 RX ADMIN — PROPOFOL 30 MG: 10 INJECTION, EMULSION INTRAVENOUS at 08:09

## 2022-02-21 RX ADMIN — FENTANYL CITRATE 100 MCG: 50 INJECTION INTRAMUSCULAR; INTRAVENOUS at 07:28

## 2022-02-21 RX ADMIN — KETOROLAC TROMETHAMINE 30 MG: 30 INJECTION, SOLUTION INTRAMUSCULAR; INTRAVENOUS at 08:56

## 2022-02-21 RX ADMIN — ONDANSETRON 4 MG: 2 INJECTION INTRAMUSCULAR; INTRAVENOUS at 07:36

## 2022-02-21 RX ADMIN — SODIUM CHLORIDE, POTASSIUM CHLORIDE, SODIUM LACTATE AND CALCIUM CHLORIDE: 600; 310; 30; 20 INJECTION, SOLUTION INTRAVENOUS at 07:28

## 2022-02-21 RX ADMIN — PROPOFOL 150 MG: 10 INJECTION, EMULSION INTRAVENOUS at 07:36

## 2022-02-21 RX ADMIN — CEFOXITIN 2 G: 2 INJECTION, POWDER, FOR SOLUTION INTRAVENOUS at 07:28

## 2022-02-21 RX ADMIN — MIDAZOLAM 2 MG: 1 INJECTION INTRAMUSCULAR; INTRAVENOUS at 07:28

## 2022-02-21 RX ADMIN — EPHEDRINE SULFATE 10 MG: 50 INJECTION, SOLUTION INTRAVENOUS at 07:46

## 2022-02-21 RX ADMIN — LIDOCAINE HYDROCHLORIDE 60 MG: 20 INJECTION, SOLUTION INFILTRATION; PERINEURAL at 07:36

## 2022-02-21 RX ADMIN — EPHEDRINE SULFATE 10 MG: 50 INJECTION, SOLUTION INTRAVENOUS at 07:40

## 2022-02-21 RX ADMIN — FAMOTIDINE 20 MG: 10 INJECTION INTRAVENOUS at 07:36

## 2022-02-21 RX ADMIN — SODIUM CHLORIDE, POTASSIUM CHLORIDE, SODIUM LACTATE AND CALCIUM CHLORIDE 125 ML/HR: 600; 310; 30; 20 INJECTION, SOLUTION INTRAVENOUS at 06:59

## 2022-02-21 RX ADMIN — PROPOFOL 50 MG: 10 INJECTION, EMULSION INTRAVENOUS at 08:05

## 2022-02-21 NOTE — ANESTHESIA PREPROCEDURE EVALUATION
Anesthesia Evaluation     history of anesthetic complications: PONV  NPO Solid Status: > 8 hours  NPO Liquid Status: > 8 hours           Airway   Mallampati: II  TM distance: >3 FB  Neck ROM: full  No difficulty expected  Dental - normal exam     Pulmonary - normal exam   (+) sleep apnea,   Cardiovascular - normal exam    (+) hyperlipidemia,       Neuro/Psych  (+) seizures,    GI/Hepatic/Renal/Endo    (+)  GERD,  thyroid problem hypothyroidism    Musculoskeletal     Abdominal  - normal exam   Substance History      OB/GYN          Other        ROS/Med Hx Other: Mild MR                    Anesthesia Plan    ASA 3     general     intravenous induction     Anesthetic plan, all risks, benefits, and alternatives have been provided, discussed and informed consent has been obtained with: patient and mother.

## 2022-02-21 NOTE — ANESTHESIA POSTPROCEDURE EVALUATION
Patient: Laura Witt    Procedure Summary     Date: 02/21/22 Room / Location:  [facility] 02 /  [facility]    Anesthesia Start: 0728 Anesthesia Stop: 0814    Procedure: HYSTEROSCOPY WITH AND WITHOUT DILATATION AND CURETTAGE (N/A Vagina) Diagnosis: (N85.9)    Surgeons: Ella Leal MD Provider: Bennie Marshall MD    Anesthesia Type: general ASA Status: 3          Anesthesia Type: general    Vitals  Vitals Value Taken Time   /92 02/21/22 0844   Temp 97.2 °F (36.2 °C) 02/21/22 0815   Pulse 78 02/21/22 0844   Resp 17 02/21/22 0844   SpO2 98 % 02/21/22 0844           Post Anesthesia Care and Evaluation    Patient location during evaluation: PHASE II  Patient participation: complete - patient participated  Level of consciousness: awake and alert  Pain score: 0  Pain management: adequate  Airway patency: patent  Anesthetic complications: No anesthetic complications    Cardiovascular status: acceptable  Respiratory status: acceptable  Hydration status: acceptable

## 2022-02-21 NOTE — OP NOTE
DILATATION AND CURETTAGE HYSTEROSCOPY  Procedure Note    Laura Witt  2/21/2022    Pre-op Diagnosis:   Pelvic pain    Post-op Diagnosis:     Hematometra    Procedure(s):  HYSTEROSCOPY WITH AND WITHOUT DILATATION AND CURETTAGE    Surgeon(s):  Ella Leal MD    Anesthesia: Anesthesia type not filed in the log.    Estimated Blood Loss: 10 mL    Hysteroscopic fluid deficit: 300    IV Fluids: 700ml    Specimens:  Order Name Source Comment Collection Info Order Time   PREGNANCY, URINE    2/21/2022  6:16 AM     Release to patient   Immediate        ABO/RH SPECIMEN VERIFICATION PREOP   Collected By: Den Mendoza, PCT 2/21/2022  6:16 AM     Release to patient   Immediate        TISSUE PATHOLOGY EXAM Endometrial Curettings  Collected By: Ella Leal MD 2/21/2022  7:47 AM     Release to patient   Immediate              Procedure:   The patient was taken to the operating room after informed written consent was obtained.  A timeout procedure was performed. The patient was placed under general anesthesia and then prepared and draped in the dorsal lithotomy position under sterile conditions.  We placed a speculum into the vagina. We grasped the anterior lip of the cervix with a toothed tenaculum. The cervix was dilated to accomodate the hysteroscope. During the dilation, there was large amount of old appearing dark blood noted effluxing through the cervix. The hysteroscope was inserted.Uterine survey revealed large amount of blood clot and intrauterine synechiae. We then removed the hysteroscope. Uterus was sounded to 8.5cm. We then did a gentle curettage in all quadrants of the uterus until a fine gritty texture was noted. Hysteroscope was replaced, but again large amount of synechiae obstructed visualization of entire uterine cavity.The endometrial curettings were sent to pathology as a specimen. There was noted to be 1st degree left vaginal laceration from the speculum which was repaired with figure of  8 with 3-0 vicryl.There were no complications with the procedure and all the counts were correct.     Findings: Hematometra, intrauterine synechiae    Complications: none    Grafts or Implants: ANGEL Leal MD     Date: 2/21/2022  Time: 08:12 EST

## 2022-02-24 LAB
LAB AP CASE REPORT: NORMAL
PATH REPORT.FINAL DX SPEC: NORMAL

## 2022-05-02 ENCOUNTER — TRANSCRIBE ORDERS (OUTPATIENT)
Dept: ADMINISTRATIVE | Facility: HOSPITAL | Age: 47
End: 2022-05-02

## 2022-05-02 DIAGNOSIS — D44.0 TERATOMA OF UNCERTAIN BEHAVIOR OF THYROID GLAND: Primary | ICD-10-CM

## 2022-05-12 ENCOUNTER — HOSPITAL ENCOUNTER (OUTPATIENT)
Dept: MAMMOGRAPHY | Facility: HOSPITAL | Age: 47
Discharge: HOME | End: 2022-05-12
Admitting: FAMILY MEDICINE

## 2022-05-12 DIAGNOSIS — Z12.31 VISIT FOR SCREENING MAMMOGRAM: ICD-10-CM

## 2022-05-12 PROCEDURE — 77063 BREAST TOMOSYNTHESIS BI: CPT | Performed by: RADIOLOGY

## 2022-05-12 PROCEDURE — 77067 SCR MAMMO BI INCL CAD: CPT

## 2022-05-12 PROCEDURE — 77063 BREAST TOMOSYNTHESIS BI: CPT

## 2022-05-12 PROCEDURE — 77067 SCR MAMMO BI INCL CAD: CPT | Performed by: RADIOLOGY

## 2022-05-18 ENCOUNTER — APPOINTMENT (OUTPATIENT)
Dept: ULTRASOUND IMAGING | Facility: HOSPITAL | Age: 47
End: 2022-05-18

## 2022-06-14 ENCOUNTER — APPOINTMENT (OUTPATIENT)
Dept: ULTRASOUND IMAGING | Facility: HOSPITAL | Age: 47
End: 2022-06-14

## 2022-06-28 ENCOUNTER — HOSPITAL ENCOUNTER (OUTPATIENT)
Dept: ULTRASOUND IMAGING | Facility: HOSPITAL | Age: 47
Discharge: HOME | End: 2022-06-28
Admitting: OTOLARYNGOLOGY

## 2022-06-28 DIAGNOSIS — D44.0 TERATOMA OF UNCERTAIN BEHAVIOR OF THYROID GLAND: ICD-10-CM

## 2022-06-28 PROCEDURE — 76536 US EXAM OF HEAD AND NECK: CPT | Performed by: RADIOLOGY

## 2022-06-28 PROCEDURE — 76536 US EXAM OF HEAD AND NECK: CPT

## 2022-11-16 NOTE — ANESTHESIA PROCEDURE NOTES
Airway  Urgency: elective    Date/Time: 2/21/2022 7:36 AM  Airway not difficult    General Information and Staff    Patient location during procedure: OR  Anesthesiologist: Bennie Marshall MD  CRNA: Conrad Gilliland CRNA    Indications and Patient Condition  Indications for airway management: airway protection    Preoxygenated: yes  MILS maintained throughout  Mask difficulty assessment: 0 - not attempted    Final Airway Details  Final airway type: supraglottic airway      Successful airway: unique  Size 4    Number of attempts at approach: 1  Assessment: lips, teeth, and gum same as pre-op and atraumatic intubation    Additional Comments  Dentition & lips unchanged from preop             Cataract(s) are not yet visually significant to the patient. Recommend monitoring, and patient agrees with this plan.

## 2023-01-26 ENCOUNTER — HOSPITAL ENCOUNTER (OUTPATIENT)
Dept: RESPIRATORY THERAPY | Facility: HOSPITAL | Age: 48
Discharge: HOME | End: 2023-01-26
Admitting: PHYSICIAN ASSISTANT
Payer: MEDICARE

## 2023-01-26 ENCOUNTER — TRANSCRIBE ORDERS (OUTPATIENT)
Dept: ADMINISTRATIVE | Facility: HOSPITAL | Age: 48
End: 2023-01-26
Payer: MEDICARE

## 2023-01-26 DIAGNOSIS — I45.81 LONG QT SYNDROME: Primary | ICD-10-CM

## 2023-01-26 DIAGNOSIS — I45.81 LONG QT SYNDROME: ICD-10-CM

## 2023-01-26 PROCEDURE — 93010 ELECTROCARDIOGRAM REPORT: CPT | Performed by: INTERNAL MEDICINE

## 2023-01-26 PROCEDURE — 93005 ELECTROCARDIOGRAM TRACING: CPT | Performed by: PHYSICIAN ASSISTANT

## 2023-01-29 LAB
QT INTERVAL: 452 MS
QTC INTERVAL: 491 MS

## 2023-06-01 ENCOUNTER — TRANSCRIBE ORDERS (OUTPATIENT)
Dept: ADMINISTRATIVE | Facility: HOSPITAL | Age: 48
End: 2023-06-01

## 2023-06-01 DIAGNOSIS — D44.0 NEOPLASM OF UNCERTAIN BEHAVIOR OF THYROID GLAND: Primary | ICD-10-CM

## 2024-01-09 ENCOUNTER — HOSPITAL ENCOUNTER (OUTPATIENT)
Dept: GENERAL RADIOLOGY | Facility: HOSPITAL | Age: 49
Discharge: HOME | End: 2024-01-09
Payer: MEDICARE

## 2024-01-09 ENCOUNTER — TRANSCRIBE ORDERS (OUTPATIENT)
Dept: ADMINISTRATIVE | Facility: HOSPITAL | Age: 49
End: 2024-01-09
Payer: MEDICARE

## 2024-01-09 ENCOUNTER — HOSPITAL ENCOUNTER (OUTPATIENT)
Dept: RESPIRATORY THERAPY | Facility: HOSPITAL | Age: 49
Discharge: HOME | End: 2024-01-09
Payer: MEDICARE

## 2024-01-09 DIAGNOSIS — I45.81 LONG Q-T SYNDROME: ICD-10-CM

## 2024-01-09 DIAGNOSIS — G40.909 EPILEPSY, MINOR: ICD-10-CM

## 2024-01-09 DIAGNOSIS — G40.909 EPILEPSY, MINOR: Primary | ICD-10-CM

## 2024-01-09 DIAGNOSIS — I45.81 LONG Q-T SYNDROME: Primary | ICD-10-CM

## 2024-01-09 LAB
QT INTERVAL: 438 MS
QTC INTERVAL: 451 MS

## 2024-01-09 PROCEDURE — 70260 X-RAY EXAM OF SKULL: CPT

## 2024-01-09 PROCEDURE — 93005 ELECTROCARDIOGRAM TRACING: CPT | Performed by: PHYSICIAN ASSISTANT

## 2024-02-05 ENCOUNTER — OFFICE VISIT (OUTPATIENT)
Dept: ENDOCRINOLOGY | Facility: CLINIC | Age: 49
End: 2024-02-05
Payer: MEDICARE

## 2024-02-05 VITALS
WEIGHT: 181.8 LBS | OXYGEN SATURATION: 97 % | HEIGHT: 64 IN | BODY MASS INDEX: 31.04 KG/M2 | SYSTOLIC BLOOD PRESSURE: 111 MMHG | DIASTOLIC BLOOD PRESSURE: 77 MMHG | HEART RATE: 70 BPM

## 2024-02-05 DIAGNOSIS — E04.1 THYROID CYST: Primary | ICD-10-CM

## 2024-02-05 PROCEDURE — 1159F MED LIST DOCD IN RCRD: CPT | Performed by: NURSE PRACTITIONER

## 2024-02-05 PROCEDURE — 1160F RVW MEDS BY RX/DR IN RCRD: CPT | Performed by: NURSE PRACTITIONER

## 2024-02-05 PROCEDURE — 99203 OFFICE O/P NEW LOW 30 MIN: CPT | Performed by: NURSE PRACTITIONER

## 2024-02-05 RX ORDER — DIAZEPAM 10 MG/100UL
SPRAY NASAL
COMMUNITY

## 2024-02-05 NOTE — PROGRESS NOTES
Chief Complaint   Patient presents with    Thyroid Nodule        Referring Provider  Serenity Villareal APRN HPI   Laura Witt is a 48 y.o. female had concerns including Thyroid Nodule.   Thyroid Nodule. She is here with her mother.    She was seeing Dr Sosa in [place] for her thyroid nodule and was following the growth of her nodule.  She has had this drained in the past.  Last aspiration was done in 9620-0736. She had a left thyroidectomy for a large mass in [facility], around 8098-8169. She has not had any treatment done since seeing Dr Sosa and has not had any updated imaging.    US Thyroid: 06/2023  FINDINGS:  LEFT THYROID LOBE:  Left thyroidectomy.  No enlarged or calcified nodules.  RIGHT THYROID LOBE:  Right slightly complex cyst of the gland measuring 4.6 cm and was about 4.6 cm grossly unchanged in size and appearance.    ISTHMUS:  Unremarkable.  No enlarged or calcified nodules.   LYMPH NODES:  Unremarkable.  No lymphadenopathy.  IMPRESSION:  1.  Left thyroidectomy.  2.  Right slightly complex cyst of the gland measuring 4.6 cm and was about 4.6 cm grossly unchanged in size and appearance.    Birth state: [place]  Previous history of radiation to face/neck: none  Consuming foods high in iodine: none  Family history of thyroid complications: maternal gma-thyroidectomy, mother-thyroidectomy due to nodule from scarring.    The following portions of the patient's history were reviewed and updated as appropriate: allergies, current medications, past family history, past medical history, past social history, past surgical history, and problem list.    Past Medical History:   Diagnosis Date    Arthritis     Elevated cholesterol     GERD (gastroesophageal reflux disease)     Glaucoma     Hypothyroid     mass    PONV (postoperative nausea and vomiting)     Scoliosis     Seizures     statuss seizures      Sleep apnea      Past Surgical History:   Procedure Laterality Date    ABDOMINAL SURGERY      COLONOSCOPY       D & C HYSTEROSCOPY N/A 2/21/2022    Procedure: HYSTEROSCOPY WITH AND WITHOUT DILATATION AND CURETTAGE;  Surgeon: Ella Leal MD;  Location: [facility] OR;  Service: Obstetrics/Gynecology;  Laterality: N/A;    ENDOMETRIAL ABLATION      IMPLANTATION VAGAL NERVE STIMULATOR      THYROID SURGERY      TONSILLECTOMY      TUBAL ABDOMINAL LIGATION      VAGAL NERVE STIMULATOR REMOVAL N/A 9/10/2019    Procedure: VAGUS NERVE STIMULATOR REMOVAL;  Surgeon: Merly Jordan MD;  Location: [facility] OR;  Service: General    VAGUS NERVE STIMULATOR IMPLANTATION N/A 9/10/2019    Procedure: VAGUS NERVE STIMULATOR IMPLANTATION;  Surgeon: Merly Jordan MD;  Location: [facility] OR;  Service: General      Family History   Problem Relation Age of Onset    Heart disease Maternal Grandmother     Cancer Maternal Grandfather         Brain    Breast cancer Neg Hx       Social History     Socioeconomic History    Marital status: Single   Tobacco Use    Smoking status: Never    Smokeless tobacco: Never   Substance and Sexual Activity    Alcohol use: No    Drug use: No    Sexual activity: Defer      Allergies   Allergen Reactions    Bee Venom Anaphylaxis      Current Outpatient Medications on File Prior to Visit   Medication Sig Dispense Refill    ALPRAZolam (XANAX) 1 MG tablet TAKE ONE TABLET BY MOUTH EVERY DAY FOR ANXIETY  0    atorvastatin (LIPITOR) 10 MG tablet TAKE ONE TABLET BY MOUTH EVERY NIGHT AT BEDTIME TO LOWER CHOLESTEROL  3    brivaracetam (Briviact) 50 MG tablet Take 1 tablet by mouth 2 (Two) Times a Day.      cetirizine (zyrTEC) 10 MG tablet Take 1 tablet by mouth Daily.      Cholecalciferol (VITAMIN D3) 20315 units capsule TAKE ONE CAPSULE BY MOUTH EVERY WEEK FOR THE BONES  2    diazePAM (Valtoco 10 MG Dose) 10 MG/0.1ML liquid into the nostril(s) as directed by provider.      docusate sodium (COLACE) 100 MG capsule Take 1 capsule by mouth Daily.      EPINEPHrine (EPIPEN 2-RUSH IJ) Inject 1 pen as directed As Needed.       "escitalopram (LEXAPRO) 10 MG tablet Take 1 tablet by mouth Daily. as directed  3    famotidine (PEPCID) 20 MG tablet Take 1 tablet by mouth Every Other Day.      ibuprofen (ADVIL,MOTRIN) 600 MG tablet Take 1 tablet by mouth Every 6 (Six) Hours. 30 tablet 0    LAMICTAL 150 MG tablet Take 1 tablet by mouth 2 (Two) Times a Day. as directed  2    latanoprost (XALATAN) 0.005 % ophthalmic solution 1 drop Every Night.      melatonin 3 MG tablet Take 1 tablet by mouth At Night As Needed.  0    NYSTATIN 361210 UNIT/GM topical powder APPLY TO AFFECTED AREA EVERY DAY AS NEEDED  0    omeprazole (priLOSEC) 40 MG capsule Take 1 capsule by mouth Daily.  3    ONFI 20 MG tablet Take 1 tablet by mouth 2 (Two) Times a Day. as directed  2    VIMPAT 200 MG tablet Take 1 tablet by mouth 2 (Two) Times a Day. as directed  2    ziprasidone (GEODON) 40 MG capsule TAKE ONE CAPSULE BY MOUTH EVERY DAY AT NOON AS DIRECTED  2    ziprasidone (GEODON) 80 MG capsule TAKE ONE CAPSULE BY MOUTH EVERY NIGHT AT BEDTIME AS DIRECTED  2     No current facility-administered medications on file prior to visit.      Review of Systems   Constitutional: Negative.    HENT:  Positive for trouble swallowing.    Eyes: Negative.    Endocrine: Negative.    Psychiatric/Behavioral: Negative.       /77 (BP Location: Left arm, Patient Position: Sitting, Cuff Size: Adult)   Pulse 70   Ht 162.6 cm (64\")   Wt 82.5 kg (181 lb 12.8 oz)   SpO2 97%   BMI 31.21 kg/m²      Physical Exam  Vitals reviewed.   Constitutional:       Appearance: Normal appearance.   Eyes:      Extraocular Movements: Extraocular movements intact.   Neck:      Comments: No palpable tissue to the left lobe region.  Large nodule palpable with tenderness to the right lobe.  Cardiovascular:      Rate and Rhythm: Normal rate.   Pulmonary:      Effort: Pulmonary effort is normal.   Neurological:      General: No focal deficit present.      Mental Status: She is alert and oriented to person, place, " "and time. Mental status is at baseline.      Comments: Has some speech difficulty.   Psychiatric:         Mood and Affect: Mood normal.         Behavior: Behavior normal.         Thought Content: Thought content normal.         Judgment: Judgment normal.       Labs/Imaging  CMP  Lab Results   Component Value Date    GLUCOSE 97 02/18/2022    BUN 10 02/18/2022    CREATININE 0.73 02/18/2022    EGFRIFNONA 85 02/18/2022    BCR 13.7 02/18/2022    K 4.1 02/18/2022    CO2 25.8 02/18/2022    CALCIUM 8.8 02/18/2022        CBC w/DIFF   Lab Results   Component Value Date    WBC 5.69 02/18/2022    RBC 3.94 02/18/2022    HGB 12.3 02/18/2022    HCT 38.0 02/18/2022    MCV 96.4 02/18/2022    MCH 31.2 02/18/2022    MCHC 32.4 02/18/2022    RDW 13.1 02/18/2022    RDWSD 46.5 02/18/2022    MPV 9.9 02/18/2022     02/18/2022    NEUTRORELPCT 64.7 02/18/2022    LYMPHORELPCT 23.9 02/18/2022    MONORELPCT 7.0 02/18/2022    EOSRELPCT 3.7 02/18/2022    BASORELPCT 0.5 02/18/2022    AUTOIGPER 0.2 02/18/2022    NEUTROABS 3.68 02/18/2022    LYMPHSABS 1.36 02/18/2022    MONOSABS 0.40 02/18/2022    EOSABS 0.21 02/18/2022    BASOSABS 0.03 02/18/2022    AUTOIGNUM 0.01 02/18/2022    NRBC 0.0 02/18/2022       TSH  No results found for: \"TSH\"    T4  No results found for: \"FREET4\"  No results found for: \"F8XIDGG\"    T3  No results found for: \"T3FREE\"  No results found for: \"U7JZYCQ\"    TRAb  No results found for: \"TSURCPAB\"    TPO  No results found for: \"THYROIDAB\"    No valid procedures specified.    Assessment and Plan    Diagnoses and all orders for this visit:    1. Thyroid cyst (Primary)  Assessment & Plan:  Discussed and reviewed the images with patient and family.  It appears the the majority of the nodule is consistent of fluid.  Discussed the various treatment options.  Cyst aspiration vs nodule removal.  They agree and would like to try a cyst aspiration to see if this will reduce the size of the nodule and alleviate the symptoms present, " prior to removing the remainder of her thyroid.  Will schedule her for a cyst aspiration.  Follow-up in 3 months.    Orders:  -     Cancel: US Fine Needle Aspiration BX 1st Lesion  -     US Guided Cyst Aspiration Thyroid         Return in about 3 months (around 5/5/2024) for Follow-up appointment. The patient was instructed to contact the clinic with any interval questions or concerns.      This document has been electronically signed by JASON Salgado  February 7, 2024 12:59 EST   Endocrinology    Please note that portions of this document were completed with a voice recognition program. Efforts were made to edit the dictations, but occasionally words are mis-transcribed.

## 2024-02-07 PROBLEM — E04.1 THYROID CYST: Status: ACTIVE | Noted: 2024-02-07

## 2024-02-07 NOTE — ASSESSMENT & PLAN NOTE
Discussed and reviewed the images with patient and family.  It appears the the majority of the nodule is consistent of fluid.  Discussed the various treatment options.  Cyst aspiration vs nodule removal.  They agree and would like to try a cyst aspiration to see if this will reduce the size of the nodule and alleviate the symptoms present, prior to removing the remainder of her thyroid.  Will schedule her for a cyst aspiration.  Follow-up in 3 months.

## 2024-02-27 ENCOUNTER — HOSPITAL ENCOUNTER (OUTPATIENT)
Dept: ULTRASOUND IMAGING | Facility: HOSPITAL | Age: 49
Discharge: HOME | End: 2024-02-27
Admitting: RADIOLOGY
Payer: MEDICARE

## 2024-02-27 VITALS
RESPIRATION RATE: 16 BRPM | HEART RATE: 86 BPM | SYSTOLIC BLOOD PRESSURE: 111 MMHG | DIASTOLIC BLOOD PRESSURE: 63 MMHG | OXYGEN SATURATION: 96 %

## 2024-02-27 PROCEDURE — 60300 ASPIR/INJ THYROID CYST: CPT | Performed by: RADIOLOGY

## 2024-02-27 PROCEDURE — 76942 ECHO GUIDE FOR BIOPSY: CPT

## 2024-02-27 NOTE — NURSING NOTE
Procedure completed, patient tolerated well, denies needs or complaints at this time. Will continue to monitor patient post procedure. Approx 8.5 ml of bloody fluid drained.

## 2024-04-29 ENCOUNTER — TRANSCRIBE ORDERS (OUTPATIENT)
Dept: ADMINISTRATIVE | Facility: HOSPITAL | Age: 49
End: 2024-04-29
Payer: MEDICARE

## 2024-04-29 DIAGNOSIS — Z12.31 BREAST CANCER SCREENING BY MAMMOGRAM: Primary | ICD-10-CM

## 2024-05-10 ENCOUNTER — HOSPITAL ENCOUNTER (OUTPATIENT)
Dept: MAMMOGRAPHY | Facility: HOSPITAL | Age: 49
Discharge: HOME | End: 2024-05-10
Admitting: NURSE PRACTITIONER
Payer: MEDICARE

## 2024-05-10 DIAGNOSIS — Z12.31 BREAST CANCER SCREENING BY MAMMOGRAM: ICD-10-CM

## 2024-05-10 PROCEDURE — 77067 SCR MAMMO BI INCL CAD: CPT

## 2024-05-10 PROCEDURE — 77063 BREAST TOMOSYNTHESIS BI: CPT

## 2024-05-20 ENCOUNTER — OFFICE VISIT (OUTPATIENT)
Dept: ENDOCRINOLOGY | Facility: CLINIC | Age: 49
End: 2024-05-20
Payer: MEDICARE

## 2024-05-20 VITALS
BODY MASS INDEX: 29.43 KG/M2 | HEART RATE: 80 BPM | SYSTOLIC BLOOD PRESSURE: 120 MMHG | DIASTOLIC BLOOD PRESSURE: 70 MMHG | OXYGEN SATURATION: 99 % | WEIGHT: 172.4 LBS | HEIGHT: 64 IN

## 2024-05-20 DIAGNOSIS — E04.1 THYROID CYST: Primary | ICD-10-CM

## 2024-05-20 PROCEDURE — 99213 OFFICE O/P EST LOW 20 MIN: CPT | Performed by: NURSE PRACTITIONER

## 2024-05-20 PROCEDURE — 1159F MED LIST DOCD IN RCRD: CPT | Performed by: NURSE PRACTITIONER

## 2024-05-20 PROCEDURE — 1160F RVW MEDS BY RX/DR IN RCRD: CPT | Performed by: NURSE PRACTITIONER

## 2024-05-20 NOTE — ASSESSMENT & PLAN NOTE
Will obtain repeat US to determine if fluid is building back up and determine what the next steps will be.  Follow-up in 1 year.

## 2024-05-20 NOTE — PROGRESS NOTES
Chief Complaint   Patient presents with    Thyroid cyst        Referring Provider  No ref. provider found     HPI   Laura Witt is a 48 y.o. female had concerns including Thyroid cyst.   Thyroid Nodule. She is here with her mother.    She had a thyroid cyst aspiration with 10 cc removal.  Denies any significant changes to her neck.  Reports that she is continuing to have dysphagia with large pills that is unchanged.    History:  She was seeing Dr Sosa in [place] for her thyroid nodule and was following the growth of her nodule.  She has had this drained in the past.  Last aspiration was done in 3201-2177. She had a left thyroidectomy for a large mass in [facility], around 3745-6331. She has not had any treatment done since seeing Dr Sosa and has not had any updated imaging.    US Thyroid: 06/2023  FINDINGS:  LEFT THYROID LOBE:  Left thyroidectomy.  No enlarged or calcified nodules.  RIGHT THYROID LOBE:  Right slightly complex cyst of the gland measuring 4.6 cm and was about 4.6 cm grossly unchanged in size and appearance.    ISTHMUS:  Unremarkable.  No enlarged or calcified nodules.   LYMPH NODES:  Unremarkable.  No lymphadenopathy.  IMPRESSION:  1.  Left thyroidectomy.  2.  Right slightly complex cyst of the gland measuring 4.6 cm and was about 4.6 cm grossly unchanged in size and appearance.    Birth state: [place]  Previous history of radiation to face/neck: none  Consuming foods high in iodine: none  Family history of thyroid complications: maternal gma-thyroidectomy, mother-thyroidectomy due to nodule from scarring.    The following portions of the patient's history were reviewed and updated as appropriate: allergies, current medications, past family history, past medical history, past social history, past surgical history, and problem list.    Past Medical History:   Diagnosis Date    Arthritis     Elevated cholesterol     GERD (gastroesophageal reflux disease)     Glaucoma     Hypothyroid     mass    PONV  (postoperative nausea and vomiting)     Scoliosis     Seizures     statuss seizures      Sleep apnea      Past Surgical History:   Procedure Laterality Date    ABDOMINAL SURGERY      COLONOSCOPY      D & C HYSTEROSCOPY N/A 2/21/2022    Procedure: HYSTEROSCOPY WITH AND WITHOUT DILATATION AND CURETTAGE;  Surgeon: Ella Leal MD;  Location: [facility];  Service: Obstetrics/Gynecology;  Laterality: N/A;    ENDOMETRIAL ABLATION      IMPLANTATION VAGAL NERVE STIMULATOR      THYROID SURGERY      TONSILLECTOMY      TUBAL ABDOMINAL LIGATION      VAGAL NERVE STIMULATOR REMOVAL N/A 9/10/2019    Procedure: VAGUS NERVE STIMULATOR REMOVAL;  Surgeon: Merly Jordan MD;  Location: [facility];  Service: General    VAGUS NERVE STIMULATOR IMPLANTATION N/A 9/10/2019    Procedure: VAGUS NERVE STIMULATOR IMPLANTATION;  Surgeon: Merly Jordan MD;  Location: [facility];  Service: General      Family History   Problem Relation Age of Onset    Heart disease Maternal Grandmother     Cancer Maternal Grandfather         Brain    Breast cancer Neg Hx       Social History     Socioeconomic History    Marital status: Single   Tobacco Use    Smoking status: Never    Smokeless tobacco: Never   Substance and Sexual Activity    Alcohol use: No    Drug use: No    Sexual activity: Defer      Allergies   Allergen Reactions    Bee Venom Anaphylaxis      Current Outpatient Medications on File Prior to Visit   Medication Sig Dispense Refill    ALPRAZolam (XANAX) 1 MG tablet TAKE ONE TABLET BY MOUTH EVERY DAY FOR ANXIETY  0    atorvastatin (LIPITOR) 10 MG tablet TAKE ONE TABLET BY MOUTH EVERY NIGHT AT BEDTIME TO LOWER CHOLESTEROL  3    brivaracetam (Briviact) 50 MG tablet Take 1 tablet by mouth 2 (Two) Times a Day.      cetirizine (zyrTEC) 10 MG tablet Take 1 tablet by mouth Daily.      Cholecalciferol (VITAMIN D3) 43102 units capsule TAKE ONE CAPSULE BY MOUTH EVERY WEEK FOR THE BONES  2    diazePAM (Valtoco 10 MG Dose) 10 MG/0.1ML liquid  "into the nostril(s) as directed by provider.      docusate sodium (COLACE) 100 MG capsule Take 1 capsule by mouth Daily.      EPINEPHrine (EPIPEN 2-RUSH IJ) Inject 1 pen as directed As Needed.      escitalopram (LEXAPRO) 10 MG tablet Take 1 tablet by mouth Daily. as directed  3    famotidine (PEPCID) 20 MG tablet Take 1 tablet by mouth Every Other Day.      ibuprofen (ADVIL,MOTRIN) 600 MG tablet Take 1 tablet by mouth Every 6 (Six) Hours. 30 tablet 0    LAMICTAL 150 MG tablet Take 200 mg by mouth 2 (Two) Times a Day. as directed  2    latanoprost (XALATAN) 0.005 % ophthalmic solution 1 drop Every Night.      melatonin 3 MG tablet Take 1 tablet by mouth At Night As Needed.  0    NYSTATIN 100420 UNIT/GM topical powder APPLY TO AFFECTED AREA EVERY DAY AS NEEDED  0    omeprazole (priLOSEC) 40 MG capsule Take 1 capsule by mouth Daily.  3    ONFI 20 MG tablet Take 1 tablet by mouth 2 (Two) Times a Day. as directed  2    VIMPAT 200 MG tablet Take 1 tablet by mouth 2 (Two) Times a Day. as directed  2    ziprasidone (GEODON) 40 MG capsule TAKE ONE CAPSULE BY MOUTH EVERY DAY AT NOON AS DIRECTED  2    ziprasidone (GEODON) 80 MG capsule TAKE ONE CAPSULE BY MOUTH EVERY NIGHT AT BEDTIME AS DIRECTED  2     No current facility-administered medications on file prior to visit.      Review of Systems   Constitutional: Negative.    HENT:  Positive for trouble swallowing.    Eyes: Negative.    Endocrine: Negative.    Psychiatric/Behavioral: Negative.       /70 (BP Location: Right arm, Patient Position: Sitting, Cuff Size: Adult)   Pulse 80   Ht 162.6 cm (64\")   Wt 78.2 kg (172 lb 6.4 oz)   SpO2 99%   BMI 29.59 kg/m²      Physical Exam  Vitals reviewed.   Constitutional:       Appearance: Normal appearance.   Eyes:      Extraocular Movements: Extraocular movements intact.   Neck:      Comments: No palpable tissue to the left lobe region.  Large nodule palpable with tenderness to the right lobe.  Cardiovascular:      Rate and " "Rhythm: Normal rate.   Pulmonary:      Effort: Pulmonary effort is normal.   Neurological:      General: No focal deficit present.      Mental Status: She is alert and oriented to person, place, and time. Mental status is at baseline.      Comments: Has some speech difficulty.   Psychiatric:         Mood and Affect: Mood normal.         Behavior: Behavior normal.         Thought Content: Thought content normal.         Judgment: Judgment normal.       Labs/Imaging  CMP  Lab Results   Component Value Date    GLUCOSE 97 02/18/2022    BUN 10 02/18/2022    CREATININE 0.73 02/18/2022    EGFRIFNONA 85 02/18/2022    BCR 13.7 02/18/2022    K 4.1 02/18/2022    CO2 25.8 02/18/2022    CALCIUM 8.8 02/18/2022        CBC w/DIFF   Lab Results   Component Value Date    WBC 5.69 02/18/2022    RBC 3.94 02/18/2022    HGB 12.3 02/18/2022    HCT 38.0 02/18/2022    MCV 96.4 02/18/2022    MCH 31.2 02/18/2022    MCHC 32.4 02/18/2022    RDW 13.1 02/18/2022    RDWSD 46.5 02/18/2022    MPV 9.9 02/18/2022     02/18/2022    NEUTRORELPCT 64.7 02/18/2022    LYMPHORELPCT 23.9 02/18/2022    MONORELPCT 7.0 02/18/2022    EOSRELPCT 3.7 02/18/2022    BASORELPCT 0.5 02/18/2022    AUTOIGPER 0.2 02/18/2022    NEUTROABS 3.68 02/18/2022    LYMPHSABS 1.36 02/18/2022    MONOSABS 0.40 02/18/2022    EOSABS 0.21 02/18/2022    BASOSABS 0.03 02/18/2022    AUTOIGNUM 0.01 02/18/2022    NRBC 0.0 02/18/2022       TSH  No results found for: \"TSH\"    T4  No results found for: \"FREET4\"  No results found for: \"D5TTYVG\"    T3  No results found for: \"T3FREE\"  No results found for: \"S8VNBMN\"    TRAb  No results found for: \"TSURCPAB\"    TPO  No results found for: \"THYROIDAB\"    No valid procedures specified.    Assessment and Plan    Diagnoses and all orders for this visit:    1. Thyroid cyst (Primary)  Assessment & Plan:  Will obtain repeat US to determine if fluid is building back up and determine what the next steps will be.  Follow-up in 1 year.    Orders:  -     " US Thyroid           Return in about 1 year (around 5/20/2025) for Follow-up appointment. The patient was instructed to contact the clinic with any interval questions or concerns.      This document has been electronically signed by JASON Salgado  May 20, 2024 13:03 EDT   Endocrinology    Please note that portions of this document were completed with a voice recognition program. Efforts were made to edit the dictations, but occasionally words are mis-transcribed.

## 2024-05-30 ENCOUNTER — HOSPITAL ENCOUNTER (OUTPATIENT)
Dept: GENERAL RADIOLOGY | Facility: HOSPITAL | Age: 49
Discharge: HOME | End: 2024-05-30
Payer: MEDICARE

## 2024-05-30 ENCOUNTER — HOSPITAL ENCOUNTER (OUTPATIENT)
Dept: ULTRASOUND IMAGING | Facility: HOSPITAL | Age: 49
Discharge: HOME | End: 2024-05-30
Payer: MEDICARE

## 2024-05-30 ENCOUNTER — TRANSCRIBE ORDERS (OUTPATIENT)
Dept: ADMINISTRATIVE | Facility: HOSPITAL | Age: 49
End: 2024-05-30
Payer: MEDICARE

## 2024-05-30 DIAGNOSIS — R06.02 SHORTNESS OF BREATH: Primary | ICD-10-CM

## 2024-05-30 DIAGNOSIS — R06.02 SHORTNESS OF BREATH: ICD-10-CM

## 2024-05-30 PROCEDURE — 76536 US EXAM OF HEAD AND NECK: CPT

## 2024-05-30 PROCEDURE — 71046 X-RAY EXAM CHEST 2 VIEWS: CPT

## 2024-06-04 DIAGNOSIS — E04.1 THYROID CYST: Primary | ICD-10-CM

## 2024-06-13 ENCOUNTER — OFFICE VISIT (OUTPATIENT)
Dept: ENDOCRINOLOGY | Facility: CLINIC | Age: 49
End: 2024-06-13
Payer: MEDICARE

## 2024-06-13 ENCOUNTER — TELEPHONE (OUTPATIENT)
Dept: ENDOCRINOLOGY | Facility: CLINIC | Age: 49
End: 2024-06-13
Payer: MEDICARE

## 2024-06-13 VITALS
DIASTOLIC BLOOD PRESSURE: 59 MMHG | BODY MASS INDEX: 28.51 KG/M2 | WEIGHT: 167 LBS | HEIGHT: 64 IN | HEART RATE: 100 BPM | OXYGEN SATURATION: 96 % | SYSTOLIC BLOOD PRESSURE: 100 MMHG

## 2024-06-13 DIAGNOSIS — E04.1 THYROID CYST: Primary | ICD-10-CM

## 2024-06-13 DIAGNOSIS — H57.89 DISCHARGE OF EYE, RIGHT: ICD-10-CM

## 2024-06-13 NOTE — ASSESSMENT & PLAN NOTE
Nodule noted to the right side of neck appears to to be moderate to large in size.  Will continue with scheduled referral to Dr. Whipple.  Informed mother that if the swelling continues to worsen and area appears to be larger in size over the next week or so to contact office for reevaluation and sooner appointment with surgeon.  Follow-up at next scheduled appointment.

## 2024-06-13 NOTE — TELEPHONE ENCOUNTER
PATIENTS MOTHER IS CALLING WITH CONCERNS OF PATIENTS RIGHT THYROID IS BULGING WHICH IS CAUSING PATIENTS EYE WHICH IS BULGING AND CAUSING HER TO HAVE BAD EYE INFECTIONS. THEY WANT THE REFERRAL TO SURGEON TO BE ASAP AS THE THYROID ISSUES IS CAUSING PATIENT TO HAVE LOTS OF ISSUES. PHONE NUMBER [phone]

## 2024-06-13 NOTE — TELEPHONE ENCOUNTER
If it is this bad, see if she can come in today to be seen so that I can evaluate and proceed with surgeon referral as needed.

## 2024-06-13 NOTE — PROGRESS NOTES
Chief Complaint   Patient presents with    Thyroid cyst        Referring Provider  No ref. provider found     HPI   Laura Witt is a 48 y.o. female had concerns including Thyroid cyst.   Thyroid Nodule. She is here with her mother.    Patient's mother called earlier and informed that the cyst on her neck was bulging from her neck and her eye on the same side was bulging.  Patient came in for evaluation to determine how soon she needs to see Dr. Whipple for surgical evaluation for removal.  She reports that she was seen by the eye doctor this morning and started on 4 different antibiotics for an infection to the right eye.    History:  She was seeing Dr Sosa in [place] for her thyroid nodule and was following the growth of her nodule.  She has had this drained in the past.  Last aspiration was done in [id number]. She had a left thyroidectomy for a large mass in [facility], around [id number]. She has not had any treatment done since seeing Dr Sosa and has not had any updated imaging.    US Thyroid: 06/2023  FINDINGS:  LEFT THYROID LOBE:  Left thyroidectomy.  No enlarged or calcified nodules.  RIGHT THYROID LOBE:  Right slightly complex cyst of the gland measuring 4.6 cm and was about 4.6 cm grossly unchanged in size and appearance.    ISTHMUS:  Unremarkable.  No enlarged or calcified nodules.   LYMPH NODES:  Unremarkable.  No lymphadenopathy.  IMPRESSION:  1.  Left thyroidectomy.  2.  Right slightly complex cyst of the gland measuring 4.6 cm and was about 4.6 cm grossly unchanged in size and appearance.    Birth state: [place]  Previous history of radiation to face/neck: none  Consuming foods high in iodine: none  Family history of thyroid complications: maternal gma-thyroidectomy, mother-thyroidectomy due to nodule from scarring.    The following portions of the patient's history were reviewed and updated as appropriate: allergies, current medications, past family history, past medical history, past social history, past  surgical history, and problem list.    Past Medical History:   Diagnosis Date    Arthritis     Elevated cholesterol     GERD (gastroesophageal reflux disease)     Glaucoma     Hypothyroid     mass    PONV (postoperative nausea and vomiting)     Scoliosis     Seizures     statuss seizures      Sleep apnea      Past Surgical History:   Procedure Laterality Date    ABDOMINAL SURGERY      COLONOSCOPY      D & C HYSTEROSCOPY N/A 2/21/2022    Procedure: HYSTEROSCOPY WITH AND WITHOUT DILATATION AND CURETTAGE;  Surgeon: Ella Leal MD;  Location: [facility];  Service: Obstetrics/Gynecology;  Laterality: N/A;    ENDOMETRIAL ABLATION      IMPLANTATION VAGAL NERVE STIMULATOR      THYROID SURGERY      TONSILLECTOMY      TUBAL ABDOMINAL LIGATION      VAGAL NERVE STIMULATOR REMOVAL N/A 9/10/2019    Procedure: VAGUS NERVE STIMULATOR REMOVAL;  Surgeon: Merly Jordan MD;  Location: [facility] OR;  Service: General    VAGUS NERVE STIMULATOR IMPLANTATION N/A 9/10/2019    Procedure: VAGUS NERVE STIMULATOR IMPLANTATION;  Surgeon: Merly Jordan MD;  Location: [facility] OR;  Service: General      Family History   Problem Relation Age of Onset    Heart disease Maternal Grandmother     Cancer Maternal Grandfather         Brain    Breast cancer Neg Hx       Social History     Socioeconomic History    Marital status: Single   Tobacco Use    Smoking status: Never    Smokeless tobacco: Never   Substance and Sexual Activity    Alcohol use: No    Drug use: No    Sexual activity: Defer      Allergies   Allergen Reactions    Bee Venom Anaphylaxis      Current Outpatient Medications on File Prior to Visit   Medication Sig Dispense Refill    ALPRAZolam (XANAX) 1 MG tablet TAKE ONE TABLET BY MOUTH EVERY DAY FOR ANXIETY  0    atorvastatin (LIPITOR) 10 MG tablet TAKE ONE TABLET BY MOUTH EVERY NIGHT AT BEDTIME TO LOWER CHOLESTEROL  3    brivaracetam (Briviact) 50 MG tablet Take 1 tablet by mouth 2 (Two) Times a Day.      cetirizine (zyrTEC)  "10 MG tablet Take 1 tablet by mouth Daily.      Cholecalciferol (VITAMIN D3) 00241 units capsule TAKE ONE CAPSULE BY MOUTH EVERY WEEK FOR THE BONES  2    diazePAM (Valtoco 10 MG Dose) 10 MG/0.1ML liquid into the nostril(s) as directed by provider.      docusate sodium (COLACE) 100 MG capsule Take 1 capsule by mouth Daily.      EPINEPHrine (EPIPEN 2-RUSH IJ) Inject 1 pen as directed As Needed.      escitalopram (LEXAPRO) 10 MG tablet Take 1 tablet by mouth Daily. as directed  3    famotidine (PEPCID) 20 MG tablet Take 1 tablet by mouth Every Other Day.      ibuprofen (ADVIL,MOTRIN) 600 MG tablet Take 1 tablet by mouth Every 6 (Six) Hours. 30 tablet 0    LAMICTAL 150 MG tablet Take 200 mg by mouth 2 (Two) Times a Day. as directed  2    latanoprost (XALATAN) 0.005 % ophthalmic solution 1 drop Every Night.      melatonin 3 MG tablet Take 1 tablet by mouth At Night As Needed.  0    NYSTATIN 154710 UNIT/GM topical powder APPLY TO AFFECTED AREA EVERY DAY AS NEEDED  0    omeprazole (priLOSEC) 40 MG capsule Take 1 capsule by mouth Daily.  3    ONFI 20 MG tablet Take 1 tablet by mouth 2 (Two) Times a Day. as directed  2    VIMPAT 200 MG tablet Take 1 tablet by mouth 2 (Two) Times a Day. as directed  2    ziprasidone (GEODON) 40 MG capsule TAKE ONE CAPSULE BY MOUTH EVERY DAY AT NOON AS DIRECTED  2    ziprasidone (GEODON) 80 MG capsule TAKE ONE CAPSULE BY MOUTH EVERY NIGHT AT BEDTIME AS DIRECTED  2     No current facility-administered medications on file prior to visit.      Review of Systems   Constitutional: Negative.    HENT:  Positive for trouble swallowing.    Eyes:  Positive for discharge, redness and itching.   Endocrine: Negative.    Psychiatric/Behavioral: Negative.     All other systems reviewed and are negative.    /59 (BP Location: Right arm, Patient Position: Sitting, Cuff Size: Adult)   Pulse 100   Ht 162.6 cm (64\")   Wt 75.8 kg (167 lb)   SpO2 96%   BMI 28.67 kg/m²      Physical Exam  Vitals reviewed. " "  Constitutional:       Appearance: Normal appearance.   Eyes:      General:         Right eye: Discharge present.      Extraocular Movements: Extraocular movements intact.      Comments: Mild swelling noted.   Neck:      Comments: No palpable tissue to the left lobe region.  Large nodule palpable with tenderness to the right lobe.  Cardiovascular:      Rate and Rhythm: Normal rate.   Pulmonary:      Effort: Pulmonary effort is normal.   Neurological:      General: No focal deficit present.      Mental Status: She is alert and oriented to person, place, and time. Mental status is at baseline.      Comments: Has some speech difficulty.   Psychiatric:         Mood and Affect: Mood normal.         Behavior: Behavior normal.         Thought Content: Thought content normal.         Judgment: Judgment normal.       Labs/Imaging  CMP  Lab Results   Component Value Date    GLUCOSE 97 02/18/2022    BUN 10 02/18/2022    CREATININE 0.73 02/18/2022    EGFRIFNONA 85 02/18/2022    BCR 13.7 02/18/2022    K 4.1 02/18/2022    CO2 25.8 02/18/2022    CALCIUM 8.8 02/18/2022        CBC w/DIFF   Lab Results   Component Value Date    WBC 5.69 02/18/2022    RBC 3.94 02/18/2022    HGB 12.3 02/18/2022    HCT 38.0 02/18/2022    MCV 96.4 02/18/2022    MCH 31.2 02/18/2022    MCHC 32.4 02/18/2022    RDW 13.1 02/18/2022    RDWSD 46.5 02/18/2022    MPV 9.9 02/18/2022     02/18/2022    NEUTRORELPCT 64.7 02/18/2022    LYMPHORELPCT 23.9 02/18/2022    MONORELPCT 7.0 02/18/2022    EOSRELPCT 3.7 02/18/2022    BASORELPCT 0.5 02/18/2022    AUTOIGPER 0.2 02/18/2022    NEUTROABS 3.68 02/18/2022    LYMPHSABS 1.36 02/18/2022    MONOSABS 0.40 02/18/2022    EOSABS 0.21 02/18/2022    BASOSABS 0.03 02/18/2022    AUTOIGNUM 0.01 02/18/2022    NRBC 0.0 02/18/2022       TSH  No results found for: \"TSH\"    T4  No results found for: \"FREET4\"  No results found for: \"Y3BBQUU\"    T3  No results found for: \"T3FREE\"  No results found for: \"D8ZJDQU\"    TRAb  No " "results found for: \"TSURCPAB\"    TPO  No results found for: \"THYROIDAB\"    No valid procedures specified.    Assessment and Plan    Diagnoses and all orders for this visit:    1. Thyroid cyst (Primary)  Assessment & Plan:  Nodule noted to the right side of neck appears to to be moderate to large in size.  Will continue with scheduled referral to Dr. Whipple.  Informed mother that if the swelling continues to worsen and area appears to be larger in size over the next week or so to contact office for reevaluation and sooner appointment with surgeon.  Follow-up at next scheduled appointment.      2. Discharge of eye, right  Assessment & Plan:  Continue with treatment from ophthalmology.               Return for Next scheduled follow up. The patient was instructed to contact the clinic with any interval questions or concerns.      This document has been electronically signed by JASON Salgado  June 13, 2024 15:54 EDT   Endocrinology    Please note that portions of this document were completed with a voice recognition program. Efforts were made to edit the dictations, but occasionally words are mis-transcribed.   "

## 2024-07-01 ENCOUNTER — OFFICE VISIT (OUTPATIENT)
Dept: SURGERY | Facility: CLINIC | Age: 49
End: 2024-07-01
Payer: MEDICARE

## 2024-07-01 VITALS
DIASTOLIC BLOOD PRESSURE: 68 MMHG | SYSTOLIC BLOOD PRESSURE: 112 MMHG | HEIGHT: 64 IN | BODY MASS INDEX: 29.33 KG/M2 | WEIGHT: 171.8 LBS

## 2024-07-01 DIAGNOSIS — R63.4 UNINTENTIONAL WEIGHT LOSS: ICD-10-CM

## 2024-07-01 DIAGNOSIS — R63.0 LOSS OF APPETITE: Primary | ICD-10-CM

## 2024-07-01 PROCEDURE — 1160F RVW MEDS BY RX/DR IN RCRD: CPT

## 2024-07-01 PROCEDURE — 1159F MED LIST DOCD IN RCRD: CPT

## 2024-07-01 PROCEDURE — 99213 OFFICE O/P EST LOW 20 MIN: CPT

## 2024-07-01 NOTE — PROGRESS NOTES
Subjective   Laura Witt is a 48 y.o. female who presents today for Initial Evaluation    Chief Complaint:    Chief Complaint   Patient presents with    COLONOSCOPY AND EGD CONSULT         History of Present Illness:    History of Present Illness Laura is a 48-year-old female who presents for evaluation for loss of appetite and unintentional weight loss.  Patient is present with her caregiver who states that her last EGD and colonoscopy was in [place] several years ago.  They report that both were normal.  Patient's family complains of her having excessive gas.  Reports that she possibly had food poisoning in March and feels like that when her problems began approximately over the past 6 months patient has had unintentional weight loss and loss of appetite.  Complaining that she does not want to eat and that she is not hungry.  This is new for her.  However, patient is on a lot of daily medications and they wonder if it might be related.  She denies any blood in her stool.  Reports that at times she has trouble evacuating her bowels.  Denies any family history of colon cancer.  She denies any nausea or vomiting.  Also denies taking any blood thinning medications.    The following portions of the patient's history were reviewed and updated as appropriate: allergies, current medications, past family history, past medical history, past social history, past surgical history and problem list.    Past Medical History:  Past Medical History:   Diagnosis Date    Arthritis     Elevated cholesterol     GERD (gastroesophageal reflux disease)     Glaucoma     Hypothyroid     mass    PONV (postoperative nausea and vomiting)     Scoliosis     Seizures     statuss seizures      Sleep apnea        Social History:  Social History     Socioeconomic History    Marital status: Single   Tobacco Use    Smoking status: Never    Smokeless tobacco: Never   Vaping Use    Vaping status: Never Used   Substance and Sexual Activity     Alcohol use: No    Drug use: No    Sexual activity: Defer       Family History:  Family History   Problem Relation Age of Onset    Heart disease Maternal Grandmother     Cancer Maternal Grandfather         Brain    Breast cancer Neg Hx        Past Surgical History:  Past Surgical History:   Procedure Laterality Date    ABDOMINAL SURGERY      COLONOSCOPY      D & C HYSTEROSCOPY N/A 2/21/2022    Procedure: HYSTEROSCOPY WITH AND WITHOUT DILATATION AND CURETTAGE;  Surgeon: Ella Leal MD;  Location: [facility] OR;  Service: Obstetrics/Gynecology;  Laterality: N/A;    ENDOMETRIAL ABLATION      IMPLANTATION VAGAL NERVE STIMULATOR      THYROID SURGERY      TONSILLECTOMY      TUBAL ABDOMINAL LIGATION      VAGAL NERVE STIMULATOR REMOVAL N/A 9/10/2019    Procedure: VAGUS NERVE STIMULATOR REMOVAL;  Surgeon: Merly Jordan MD;  Location:  [facility] OR;  Service: General    VAGUS NERVE STIMULATOR IMPLANTATION N/A 9/10/2019    Procedure: VAGUS NERVE STIMULATOR IMPLANTATION;  Surgeon: Merly Jordan MD;  Location: [facility] OR;  Service: General       Problem List:  Patient Active Problem List   Diagnosis    Partial symptomatic epilepsy with complex partial seizures, intractable, without status epilepticus    Thyroid cyst    Discharge of eye, right       Allergy:   Allergies   Allergen Reactions    Bee Venom Anaphylaxis        Current Medications:   Current Outpatient Medications   Medication Sig Dispense Refill    ALPRAZolam (XANAX) 1 MG tablet TAKE ONE TABLET BY MOUTH EVERY DAY FOR ANXIETY  0    atorvastatin (LIPITOR) 10 MG tablet TAKE ONE TABLET BY MOUTH EVERY NIGHT AT BEDTIME TO LOWER CHOLESTEROL  3    brivaracetam (Briviact) 50 MG tablet Take 1 tablet by mouth 2 (Two) Times a Day.      cetirizine (zyrTEC) 10 MG tablet Take 1 tablet by mouth Daily.      Cholecalciferol (VITAMIN D3) 29220 units capsule TAKE ONE CAPSULE BY MOUTH EVERY WEEK FOR THE BONES  2    diazePAM (Valtoco 10 MG Dose) 10 MG/0.1ML liquid into the  nostril(s) as directed by provider.      docusate sodium (COLACE) 100 MG capsule Take 1 capsule by mouth Daily.      EPINEPHrine (EPIPEN 2-RUSH IJ) Inject 1 pen as directed As Needed.      escitalopram (LEXAPRO) 10 MG tablet Take 1 tablet by mouth Daily. as directed  3    famotidine (PEPCID) 20 MG tablet Take 1 tablet by mouth Every Other Day.      ibuprofen (ADVIL,MOTRIN) 600 MG tablet Take 1 tablet by mouth Every 6 (Six) Hours. 30 tablet 0    LAMICTAL 150 MG tablet Take 200 mg by mouth 2 (Two) Times a Day. as directed  2    latanoprost (XALATAN) 0.005 % ophthalmic solution 1 drop Every Night.      melatonin 3 MG tablet Take 1 tablet by mouth At Night As Needed.  0    NYSTATIN 299730 UNIT/GM topical powder APPLY TO AFFECTED AREA EVERY DAY AS NEEDED  0    omeprazole (priLOSEC) 40 MG capsule Take 1 capsule by mouth Daily.  3    ONFI 20 MG tablet Take 1 tablet by mouth 2 (Two) Times a Day. as directed  2    VIMPAT 200 MG tablet Take 1 tablet by mouth 2 (Two) Times a Day. as directed  2    ziprasidone (GEODON) 40 MG capsule TAKE ONE CAPSULE BY MOUTH EVERY DAY AT NOON AS DIRECTED  2    ziprasidone (GEODON) 80 MG capsule TAKE ONE CAPSULE BY MOUTH EVERY NIGHT AT BEDTIME AS DIRECTED  2     No current facility-administered medications for this visit.       Review of Systems:    Review of Systems   Constitutional:  Positive for appetite change and unexpected weight loss.         Physical Exam:   Physical Exam  Constitutional:       Appearance: Normal appearance.   HENT:      Head: Normocephalic and atraumatic.      Right Ear: External ear normal.      Left Ear: External ear normal.   Eyes:      Conjunctiva/sclera: Conjunctivae normal.   Pulmonary:      Effort: Pulmonary effort is normal.   Abdominal:      General: Abdomen is flat.      Palpations: Abdomen is soft.   Musculoskeletal:         General: Normal range of motion.      Cervical back: Normal range of motion and neck supple.   Skin:     General: Skin is warm and dry.  "     Capillary Refill: Capillary refill takes less than 2 seconds.   Neurological:      General: No focal deficit present.      Mental Status: She is alert and oriented to person, place, and time.   Psychiatric:         Mood and Affect: Mood normal.         Behavior: Behavior normal.         Vitals:  Blood pressure 112/68, height 162.6 cm (64\"), weight 77.9 kg (171 lb 12.8 oz), not currently breastfeeding.   Body mass index is 29.49 kg/m².       Assessment & Plan   Diagnoses and all orders for this visit:    1. Loss of appetite (Primary)  -     Case Request; Standing  -     Case Request    2. Unintentional weight loss  -     Case Request; Standing  -     Case Request    Other orders  -     Follow Anesthesia Guidelines / Protocol; Future  -     Follow Anesthesia Guidelines / Protocol; Standing  -     Verify / Perform Chlorhexidine Skin Prep; Standing  -     Obtain Informed Consent; Future  -     Provide NPO Instructions to Patient; Future  -     Chlorhexidine Skin Prep; Future  -     Place Sequential Compression Device; Standing  -     Maintain Sequential Compression Device; Standing      Laura is a 48-year-old female who presents with loss of appetite and unintentional weight loss.  To undergo EGD and colonoscopy Dr. Sultana.  Verbalized understanding prep instructions and procedure wished to proceed.    Visit Diagnoses:    ICD-10-CM ICD-9-CM   1. Loss of appetite  R63.0 783.0   2. Unintentional weight loss  R63.4 783.21         MEDS ORDERED DURING VISIT:  No orders of the defined types were placed in this encounter.      Return for Follow-up postop.             This document has been electronically signed by JASON Tejada  July 1, 2024 13:34 EDT    Please note that portions of this note were completed with a voice recognition program.   "

## 2024-07-02 ENCOUNTER — PATIENT ROUNDING (BHMG ONLY) (OUTPATIENT)
Dept: SURGERY | Facility: CLINIC | Age: 49
End: 2024-07-02
Payer: MEDICARE

## 2024-07-02 DIAGNOSIS — R63.4 WEIGHT LOSS: Primary | ICD-10-CM

## 2024-07-02 NOTE — PROGRESS NOTES
July 2, 2024    Hello, may I speak with Laura Witt?    My name is teresa      I am  with [facility]  [address] [place] [address]  [place] [address] [phone].    Before we get started may I verify your date of birth? 1975    I am calling to officially welcome you to our practice and ask about your recent visit. Is this a good time to talk? yes    Tell me about your visit with us. What things went well?  I really liked Ta       We're always looking for ways to make our patients' experiences even better. Do you have recommendations on ways we may improve?  no    Overall were you satisfied with your first visit to our practice? yes       I appreciate you taking the time to speak with me today. Is there anything else I can do for you? no      Thank you, and have a great day.

## 2024-07-08 ENCOUNTER — TELEPHONE (OUTPATIENT)
Age: 49
End: 2024-07-08
Payer: MEDICARE

## 2024-07-08 PROBLEM — R63.0 LOSS OF APPETITE: Status: ACTIVE | Noted: 2024-07-01

## 2024-07-08 PROBLEM — R63.4 UNINTENTIONAL WEIGHT LOSS: Status: ACTIVE | Noted: 2024-07-01

## 2024-07-08 NOTE — TELEPHONE ENCOUNTER
Called to inform patient of Colonoscopy/EGD scheduled for 07/22 with Dr. Lobato with and arrival time of 6:30 am. Patient verbalized understanding of bowel prep.

## 2024-08-07 ENCOUNTER — PRE-ADMISSION TESTING (OUTPATIENT)
Dept: PREADMISSION TESTING | Facility: HOSPITAL | Age: 49
End: 2024-08-07
Payer: MEDICARE

## 2024-08-07 VITALS — BODY MASS INDEX: 30.59 KG/M2 | WEIGHT: 172.62 LBS | HEIGHT: 63 IN

## 2024-08-07 LAB
DEPRECATED RDW RBC AUTO: 47.8 FL (ref 37–54)
ERYTHROCYTE [DISTWIDTH] IN BLOOD BY AUTOMATED COUNT: 13.4 % (ref 12.3–15.4)
HBA1C MFR BLD: 5 % (ref 4.8–5.6)
HCT VFR BLD AUTO: 34.6 % (ref 34–46.6)
HGB BLD-MCNC: 11.3 G/DL (ref 12–15.9)
MCH RBC QN AUTO: 31.3 PG (ref 26.6–33)
MCHC RBC AUTO-ENTMCNC: 32.7 G/DL (ref 31.5–35.7)
MCV RBC AUTO: 95.8 FL (ref 79–97)
PLATELET # BLD AUTO: 233 10*3/MM3 (ref 140–450)
PMV BLD AUTO: 9.7 FL (ref 6–12)
RBC # BLD AUTO: 3.61 10*6/MM3 (ref 3.77–5.28)
WBC NRBC COR # BLD AUTO: 5 10*3/MM3 (ref 3.4–10.8)

## 2024-08-07 PROCEDURE — 85027 COMPLETE CBC AUTOMATED: CPT

## 2024-08-07 PROCEDURE — 36415 COLL VENOUS BLD VENIPUNCTURE: CPT

## 2024-08-07 PROCEDURE — 83036 HEMOGLOBIN GLYCOSYLATED A1C: CPT

## 2024-08-20 ENCOUNTER — ANESTHESIA EVENT (OUTPATIENT)
Dept: PERIOP | Facility: HOSPITAL | Age: 49
End: 2024-08-20
Payer: MEDICARE

## 2024-08-20 RX ORDER — SODIUM CHLORIDE 0.9 % (FLUSH) 0.9 %
10 SYRINGE (ML) INJECTION EVERY 12 HOURS SCHEDULED
Status: CANCELLED | OUTPATIENT
Start: 2024-08-20

## 2024-08-20 RX ORDER — SODIUM CHLORIDE 0.9 % (FLUSH) 0.9 %
10 SYRINGE (ML) INJECTION AS NEEDED
Status: CANCELLED | OUTPATIENT
Start: 2024-08-20

## 2024-08-20 RX ORDER — FAMOTIDINE 10 MG/ML
20 INJECTION, SOLUTION INTRAVENOUS ONCE
Status: CANCELLED | OUTPATIENT
Start: 2024-08-20 | End: 2024-08-20

## 2024-08-20 RX ORDER — SODIUM CHLORIDE 9 MG/ML
40 INJECTION, SOLUTION INTRAVENOUS AS NEEDED
Status: CANCELLED | OUTPATIENT
Start: 2024-08-20

## 2024-08-21 ENCOUNTER — HOSPITAL ENCOUNTER (OUTPATIENT)
Facility: HOSPITAL | Age: 49
Discharge: HOME | End: 2024-08-22
Attending: SURGERY | Admitting: SURGERY
Payer: MEDICARE

## 2024-08-21 ENCOUNTER — ANESTHESIA (OUTPATIENT)
Dept: PERIOP | Facility: HOSPITAL | Age: 49
End: 2024-08-21
Payer: MEDICARE

## 2024-08-21 DIAGNOSIS — E04.1 RIGHT THYROID NODULE: ICD-10-CM

## 2024-08-21 LAB
CALCIUM SPEC-SCNC: 8.4 MG/DL (ref 8.6–10.5)
PTH-INTACT SERPL-MCNC: 12.7 PG/ML (ref 15–65)

## 2024-08-21 PROCEDURE — A9270 NON-COVERED ITEM OR SERVICE: HCPCS | Performed by: SURGERY

## 2024-08-21 PROCEDURE — 63710000001 FAMOTIDINE 20 MG TABLET: Performed by: SURGERY

## 2024-08-21 PROCEDURE — 25810000003 LACTATED RINGERS PER 1000 ML: Performed by: ANESTHESIOLOGY

## 2024-08-21 PROCEDURE — 82310 ASSAY OF CALCIUM: CPT | Performed by: SURGERY

## 2024-08-21 PROCEDURE — 25010000002 PROPOFOL 10 MG/ML EMULSION: Performed by: ANESTHESIOLOGY

## 2024-08-21 PROCEDURE — 63710000001 CALCITRIOL 0.25 MCG CAPSULE: Performed by: SURGERY

## 2024-08-21 PROCEDURE — 63710000001 ACETAMINOPHEN 325 MG TABLET: Performed by: SURGERY

## 2024-08-21 PROCEDURE — 25010000002 FENTANYL CITRATE (PF) 100 MCG/2ML SOLUTION: Performed by: ANESTHESIOLOGY

## 2024-08-21 PROCEDURE — 88307 TISSUE EXAM BY PATHOLOGIST: CPT | Performed by: SURGERY

## 2024-08-21 PROCEDURE — 25010000002 CEFAZOLIN PER 500 MG: Performed by: SURGERY

## 2024-08-21 PROCEDURE — 63710000001: Performed by: SURGERY

## 2024-08-21 PROCEDURE — 83970 ASSAY OF PARATHORMONE: CPT | Performed by: SURGERY

## 2024-08-21 PROCEDURE — 63710000001 ZIPRASIDONE 20 MG CAPSULE: Performed by: SURGERY

## 2024-08-21 PROCEDURE — 25010000002 SUCCINYLCHOLINE PER 20 MG: Performed by: ANESTHESIOLOGY

## 2024-08-21 PROCEDURE — 25010000002 ONDANSETRON PER 1 MG: Performed by: ANESTHESIOLOGY

## 2024-08-21 PROCEDURE — 25010000002 DEXAMETHASONE PER 1 MG: Performed by: ANESTHESIOLOGY

## 2024-08-21 PROCEDURE — 60260 REPEAT THYROID SURGERY: CPT | Performed by: PHYSICIAN ASSISTANT

## 2024-08-21 PROCEDURE — 63710000001 CALCIUM CARBONATE 500 MG CHEWABLE TABLET: Performed by: SURGERY

## 2024-08-21 DEVICE — CLIP LIGAT VASC HORIZON TI SM YEL 6CT: Type: IMPLANTABLE DEVICE | Site: NECK | Status: FUNCTIONAL

## 2024-08-21 DEVICE — HORIZON TI MED 6/CART
Type: IMPLANTABLE DEVICE | Site: NECK | Status: FUNCTIONAL
Brand: WECK

## 2024-08-21 DEVICE — ABSORBABLE HEMOSTAT (OXIDIZED REGENERATED CELLULOSE)
Type: IMPLANTABLE DEVICE | Site: NECK | Status: FUNCTIONAL
Brand: SURGICEL

## 2024-08-21 RX ORDER — LIDOCAINE HYDROCHLORIDE 10 MG/ML
INJECTION, SOLUTION EPIDURAL; INFILTRATION; INTRACAUDAL; PERINEURAL AS NEEDED
Status: DISCONTINUED | OUTPATIENT
Start: 2024-08-21 | End: 2024-08-21 | Stop reason: SURG

## 2024-08-21 RX ORDER — FENTANYL CITRATE 50 UG/ML
INJECTION, SOLUTION INTRAMUSCULAR; INTRAVENOUS AS NEEDED
Status: DISCONTINUED | OUTPATIENT
Start: 2024-08-21 | End: 2024-08-21 | Stop reason: SURG

## 2024-08-21 RX ORDER — HYDROMORPHONE HYDROCHLORIDE 1 MG/ML
0.5 INJECTION, SOLUTION INTRAMUSCULAR; INTRAVENOUS; SUBCUTANEOUS
Status: DISCONTINUED | OUTPATIENT
Start: 2024-08-21 | End: 2024-08-21 | Stop reason: HOSPADM

## 2024-08-21 RX ORDER — MIDAZOLAM HYDROCHLORIDE 1 MG/ML
1 INJECTION INTRAMUSCULAR; INTRAVENOUS
Status: DISCONTINUED | OUTPATIENT
Start: 2024-08-21 | End: 2024-08-21 | Stop reason: HOSPADM

## 2024-08-21 RX ORDER — CALCITRIOL 0.25 UG/1
0.5 CAPSULE, LIQUID FILLED ORAL EVERY 12 HOURS SCHEDULED
Status: DISCONTINUED | OUTPATIENT
Start: 2024-08-21 | End: 2024-08-22 | Stop reason: HOSPADM

## 2024-08-21 RX ORDER — ESCITALOPRAM OXALATE 10 MG/1
10 TABLET ORAL DAILY
Status: DISCONTINUED | OUTPATIENT
Start: 2024-08-22 | End: 2024-08-22 | Stop reason: HOSPADM

## 2024-08-21 RX ORDER — LAMOTRIGINE 100 MG/1
200 TABLET ORAL 2 TIMES DAILY
Status: DISCONTINUED | OUTPATIENT
Start: 2024-08-21 | End: 2024-08-21

## 2024-08-21 RX ORDER — LIDOCAINE HYDROCHLORIDE 10 MG/ML
0.5 INJECTION, SOLUTION EPIDURAL; INFILTRATION; INTRACAUDAL; PERINEURAL ONCE AS NEEDED
Status: COMPLETED | OUTPATIENT
Start: 2024-08-21 | End: 2024-08-21

## 2024-08-21 RX ORDER — SODIUM CHLORIDE, SODIUM LACTATE, POTASSIUM CHLORIDE, CALCIUM CHLORIDE 600; 310; 30; 20 MG/100ML; MG/100ML; MG/100ML; MG/100ML
9 INJECTION, SOLUTION INTRAVENOUS CONTINUOUS
Status: DISCONTINUED | OUTPATIENT
Start: 2024-08-21 | End: 2024-08-22 | Stop reason: HOSPADM

## 2024-08-21 RX ORDER — EPHEDRINE SULFATE 50 MG/ML
5 INJECTION, SOLUTION INTRAVENOUS ONCE AS NEEDED
Status: DISCONTINUED | OUTPATIENT
Start: 2024-08-21 | End: 2024-08-21 | Stop reason: HOSPADM

## 2024-08-21 RX ORDER — CLOBAZAM 10 MG/1
20 TABLET ORAL 2 TIMES DAILY
Status: DISCONTINUED | OUTPATIENT
Start: 2024-08-21 | End: 2024-08-21

## 2024-08-21 RX ORDER — SUCCINYLCHOLINE CHLORIDE 20 MG/ML
INJECTION INTRAMUSCULAR; INTRAVENOUS AS NEEDED
Status: DISCONTINUED | OUTPATIENT
Start: 2024-08-21 | End: 2024-08-21 | Stop reason: SURG

## 2024-08-21 RX ORDER — BUPIVACAINE HYDROCHLORIDE AND EPINEPHRINE 2.5; 5 MG/ML; UG/ML
INJECTION, SOLUTION INFILTRATION; PERINEURAL AS NEEDED
Status: DISCONTINUED | OUTPATIENT
Start: 2024-08-21 | End: 2024-08-21 | Stop reason: HOSPADM

## 2024-08-21 RX ORDER — FENTANYL CITRATE 50 UG/ML
50 INJECTION, SOLUTION INTRAMUSCULAR; INTRAVENOUS
Status: DISCONTINUED | OUTPATIENT
Start: 2024-08-21 | End: 2024-08-21 | Stop reason: HOSPADM

## 2024-08-21 RX ORDER — ALPRAZOLAM 1 MG
1 TABLET ORAL DAILY
Status: DISCONTINUED | OUTPATIENT
Start: 2024-08-22 | End: 2024-08-22 | Stop reason: HOSPADM

## 2024-08-21 RX ORDER — ONDANSETRON 4 MG/1
4 TABLET, ORALLY DISINTEGRATING ORAL EVERY 6 HOURS PRN
Status: DISCONTINUED | OUTPATIENT
Start: 2024-08-21 | End: 2024-08-22 | Stop reason: HOSPADM

## 2024-08-21 RX ORDER — DEXAMETHASONE SODIUM PHOSPHATE 4 MG/ML
INJECTION, SOLUTION INTRA-ARTICULAR; INTRALESIONAL; INTRAMUSCULAR; INTRAVENOUS; SOFT TISSUE AS NEEDED
Status: DISCONTINUED | OUTPATIENT
Start: 2024-08-21 | End: 2024-08-21 | Stop reason: SURG

## 2024-08-21 RX ORDER — ZIPRASIDONE HYDROCHLORIDE 20 MG/1
80 CAPSULE ORAL NIGHTLY
Status: DISCONTINUED | OUTPATIENT
Start: 2024-08-21 | End: 2024-08-22 | Stop reason: HOSPADM

## 2024-08-21 RX ORDER — ONDANSETRON 2 MG/ML
4 INJECTION INTRAMUSCULAR; INTRAVENOUS ONCE AS NEEDED
Status: DISCONTINUED | OUTPATIENT
Start: 2024-08-21 | End: 2024-08-21 | Stop reason: HOSPADM

## 2024-08-21 RX ORDER — ZIPRASIDONE HYDROCHLORIDE 20 MG/1
40 CAPSULE ORAL
Status: DISCONTINUED | OUTPATIENT
Start: 2024-08-22 | End: 2024-08-22 | Stop reason: HOSPADM

## 2024-08-21 RX ORDER — FAMOTIDINE 20 MG/1
20 TABLET, FILM COATED ORAL ONCE
Status: COMPLETED | OUTPATIENT
Start: 2024-08-21 | End: 2024-08-21

## 2024-08-21 RX ORDER — PROPOFOL 10 MG/ML
VIAL (ML) INTRAVENOUS AS NEEDED
Status: DISCONTINUED | OUTPATIENT
Start: 2024-08-21 | End: 2024-08-21 | Stop reason: SURG

## 2024-08-21 RX ORDER — CLOBAZAM 20 MG/1
20 TABLET ORAL 2 TIMES DAILY
Status: DISCONTINUED | OUTPATIENT
Start: 2024-08-21 | End: 2024-08-22 | Stop reason: HOSPADM

## 2024-08-21 RX ORDER — CLOBAZAM 20 MG/1
20 TABLET ORAL 2 TIMES DAILY
Status: DISCONTINUED | OUTPATIENT
Start: 2024-08-21 | End: 2024-08-21

## 2024-08-21 RX ORDER — CALCIUM CARBONATE 500 MG/1
2 TABLET, CHEWABLE ORAL 2 TIMES DAILY
Status: DISCONTINUED | OUTPATIENT
Start: 2024-08-21 | End: 2024-08-22

## 2024-08-21 RX ORDER — NALOXONE HCL 0.4 MG/ML
0.1 VIAL (ML) INJECTION
Status: DISCONTINUED | OUTPATIENT
Start: 2024-08-21 | End: 2024-08-22 | Stop reason: HOSPADM

## 2024-08-21 RX ORDER — ONDANSETRON 2 MG/ML
4 INJECTION INTRAMUSCULAR; INTRAVENOUS EVERY 6 HOURS PRN
Status: DISCONTINUED | OUTPATIENT
Start: 2024-08-21 | End: 2024-08-22 | Stop reason: HOSPADM

## 2024-08-21 RX ORDER — LACOSAMIDE 100 MG/1
200 TABLET ORAL 2 TIMES DAILY
Status: DISCONTINUED | OUTPATIENT
Start: 2024-08-21 | End: 2024-08-21

## 2024-08-21 RX ORDER — LACOSAMIDE 200 MG/1
200 TABLET ORAL 2 TIMES DAILY
Status: DISCONTINUED | OUTPATIENT
Start: 2024-08-21 | End: 2024-08-22 | Stop reason: HOSPADM

## 2024-08-21 RX ORDER — LAMOTRIGINE 200 MG/1
200 TABLET ORAL 2 TIMES DAILY
Status: DISCONTINUED | OUTPATIENT
Start: 2024-08-21 | End: 2024-08-22 | Stop reason: HOSPADM

## 2024-08-21 RX ORDER — LEVOTHYROXINE SODIUM 125 UG/1
125 TABLET ORAL
Status: DISCONTINUED | OUTPATIENT
Start: 2024-08-22 | End: 2024-08-22 | Stop reason: HOSPADM

## 2024-08-21 RX ORDER — ACETAMINOPHEN 325 MG/1
650 TABLET ORAL EVERY 4 HOURS PRN
Status: DISCONTINUED | OUTPATIENT
Start: 2024-08-21 | End: 2024-08-22 | Stop reason: HOSPADM

## 2024-08-21 RX ORDER — EPHEDRINE SULFATE 50 MG/ML
INJECTION INTRAVENOUS AS NEEDED
Status: DISCONTINUED | OUTPATIENT
Start: 2024-08-21 | End: 2024-08-21 | Stop reason: SURG

## 2024-08-21 RX ORDER — ONDANSETRON 2 MG/ML
INJECTION INTRAMUSCULAR; INTRAVENOUS AS NEEDED
Status: DISCONTINUED | OUTPATIENT
Start: 2024-08-21 | End: 2024-08-21 | Stop reason: SURG

## 2024-08-21 RX ORDER — NALOXONE HCL 0.4 MG/ML
0.4 VIAL (ML) INJECTION AS NEEDED
Status: DISCONTINUED | OUTPATIENT
Start: 2024-08-21 | End: 2024-08-21 | Stop reason: HOSPADM

## 2024-08-21 RX ORDER — DOCUSATE SODIUM 100 MG/1
100 CAPSULE, LIQUID FILLED ORAL 2 TIMES DAILY PRN
Status: DISCONTINUED | OUTPATIENT
Start: 2024-08-21 | End: 2024-08-22 | Stop reason: HOSPADM

## 2024-08-21 RX ORDER — FAMOTIDINE 20 MG/1
20 TABLET, FILM COATED ORAL 2 TIMES DAILY
Status: DISCONTINUED | OUTPATIENT
Start: 2024-08-21 | End: 2024-08-22 | Stop reason: HOSPADM

## 2024-08-21 RX ORDER — ROCURONIUM BROMIDE 10 MG/ML
INJECTION, SOLUTION INTRAVENOUS AS NEEDED
Status: DISCONTINUED | OUTPATIENT
Start: 2024-08-21 | End: 2024-08-21 | Stop reason: SURG

## 2024-08-21 RX ORDER — SODIUM CHLORIDE, SODIUM LACTATE, POTASSIUM CHLORIDE, CALCIUM CHLORIDE 600; 310; 30; 20 MG/100ML; MG/100ML; MG/100ML; MG/100ML
100 INJECTION, SOLUTION INTRAVENOUS CONTINUOUS
Status: DISCONTINUED | OUTPATIENT
Start: 2024-08-21 | End: 2024-08-22 | Stop reason: HOSPADM

## 2024-08-21 RX ORDER — OXYCODONE AND ACETAMINOPHEN 5; 325 MG/1; MG/1
1 TABLET ORAL EVERY 4 HOURS PRN
Status: DISCONTINUED | OUTPATIENT
Start: 2024-08-21 | End: 2024-08-22 | Stop reason: HOSPADM

## 2024-08-21 RX ADMIN — EPHEDRINE SULFATE 10 MG: 50 INJECTION INTRAVENOUS at 10:57

## 2024-08-21 RX ADMIN — SUCCINYLCHOLINE CHLORIDE 160 MG: 20 INJECTION, SOLUTION INTRAMUSCULAR; INTRAVENOUS at 10:30

## 2024-08-21 RX ADMIN — FAMOTIDINE 20 MG: 20 TABLET, FILM COATED ORAL at 20:34

## 2024-08-21 RX ADMIN — SODIUM CHLORIDE, POTASSIUM CHLORIDE, SODIUM LACTATE AND CALCIUM CHLORIDE 9 ML/HR: 600; 310; 30; 20 INJECTION, SOLUTION INTRAVENOUS at 09:39

## 2024-08-21 RX ADMIN — CALCITRIOL CAPSULES 0.25 MCG 0.5 MCG: 0.25 CAPSULE ORAL at 20:33

## 2024-08-21 RX ADMIN — LIDOCAINE HYDROCHLORIDE 0.5 ML: 10 INJECTION, SOLUTION EPIDURAL; INFILTRATION; INTRACAUDAL; PERINEURAL at 09:39

## 2024-08-21 RX ADMIN — EPHEDRINE SULFATE 10 MG: 50 INJECTION INTRAVENOUS at 11:35

## 2024-08-21 RX ADMIN — PROPOFOL 50 MG: 10 INJECTION, EMULSION INTRAVENOUS at 11:07

## 2024-08-21 RX ADMIN — CLOBAZAM 20 MG: 20 TABLET ORAL at 20:48

## 2024-08-21 RX ADMIN — FENTANYL CITRATE 100 MCG: 50 INJECTION, SOLUTION INTRAMUSCULAR; INTRAVENOUS at 10:30

## 2024-08-21 RX ADMIN — ROCURONIUM BROMIDE 5 MG: 10 INJECTION INTRAVENOUS at 10:30

## 2024-08-21 RX ADMIN — LIDOCAINE HYDROCHLORIDE 50 MG: 10 INJECTION, SOLUTION EPIDURAL; INFILTRATION; INTRACAUDAL; PERINEURAL at 10:30

## 2024-08-21 RX ADMIN — PROPOFOL 150 MG: 10 INJECTION, EMULSION INTRAVENOUS at 10:30

## 2024-08-21 RX ADMIN — DEXAMETHASONE SODIUM PHOSPHATE 8 MG: 4 INJECTION INTRA-ARTICULAR; INTRALESIONAL; INTRAMUSCULAR; INTRAVENOUS; SOFT TISSUE at 10:38

## 2024-08-21 RX ADMIN — LACOSAMIDE 200 MG: 200 TABLET ORAL at 20:47

## 2024-08-21 RX ADMIN — CALCIUM CARBONATE (ANTACID) CHEW TAB 500 MG 2 TABLET: 500 CHEW TAB at 20:33

## 2024-08-21 RX ADMIN — ZIPRASIDONE HYDROCHLORIDE 80 MG: 20 CAPSULE ORAL at 20:34

## 2024-08-21 RX ADMIN — BRIVARACETAM 50 MG: 50 TABLET, FILM COATED ORAL at 18:21

## 2024-08-21 RX ADMIN — ACETAMINOPHEN 650 MG: 325 TABLET ORAL at 20:48

## 2024-08-21 RX ADMIN — SODIUM CHLORIDE 2000 MG: 900 INJECTION INTRAVENOUS at 10:27

## 2024-08-21 RX ADMIN — FAMOTIDINE 20 MG: 20 TABLET, FILM COATED ORAL at 09:39

## 2024-08-21 RX ADMIN — ONDANSETRON 4 MG: 2 INJECTION INTRAMUSCULAR; INTRAVENOUS at 12:15

## 2024-08-21 RX ADMIN — LAMOTRIGINE 200 MG: 200 TABLET ORAL at 20:47

## 2024-08-21 NOTE — BRIEF OP NOTE
THYROIDECTOMY  Progress Note    Laura Witt  8/21/2024    Pre-op Diagnosis:     * Right thyroid nodule        Post-Op Diagnosis Codes:     * Right thyroid nodule     Procedure/CPT® Codes:        Procedure(s):  COMPLETION THYROIDECTOMY              Surgeon(s):  Jimmy Whipple MD    Anesthesia: General    Staff:   Circulator: Solange Andrade RN; Doris Cuevas RN; Dee Edge RN  Scrub Person: Celestine Martinez RN; Guerita Whelan  Nursing Assistant: Solange Morgan CNA  Assistant: Adrian Grimm PA  Assistant: Adrian Grimm PA      Estimated Blood Loss: 100ml    Urine Voided: * No values recorded between 8/21/2024 10:27 AM and 8/21/2024 12:18 PM *    Specimens:                Specimens       ID Source Type Tests Collected By Collected At Frozen?    A Thyroid Tissue TISSUE PATHOLOGY EXAM   Jimmy Whipple MD 8/21/24 1208     Description: right thyroid for permanent    This specimen was not marked as sent.                  Drains: * No LDAs found *    Findings: Hypervascular right thyroid lobe which was approximately 10 times the size of a normal thyroid gland        Complications: None    Assistant: Adrian Grimm PA  was responsible for performing the following activities: Retraction, Suction, Suturing, Closing, and Placing Dressing and their skilled assistance was necessary for the success of this case.    Jimmy Whipple MD     Date: 8/21/2024  Time: 12:20 EDT

## 2024-08-21 NOTE — ANESTHESIA PROCEDURE NOTES
Airway  Urgency: elective    Date/Time: 8/21/2024 10:33 AM  Airway not difficult    General Information and Staff    Patient location during procedure: OR  SRNA: Ambrocio Cai SRNA  Indications and Patient Condition  Indications for airway management: airway protection    Preoxygenated: yes  MILS not maintained throughout  Mask difficulty assessment: 1 - vent by mask    Final Airway Details  Final airway type: endotracheal airway      Successful airway: NIM tube  Cuffed: yes   Successful intubation technique: video laryngoscopy  Endotracheal tube insertion site: oral  Blade: Oswald  Blade size: 3  Cormack-Lehane Classification: grade I - full view of glottis  Placement verified by: chest auscultation and capnometry   Measured from: lips  ETT/EBT  to lips (cm): 20  Number of attempts at approach: 1  Assessment: lips, teeth, and gum same as pre-op and atraumatic intubation    Additional Comments  Negative epigastric sounds, Breath sound equal bilaterally with symmetric chest rise and fall. Dentition unchanged from pre-procedure status.
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
## 2024-08-21 NOTE — ANESTHESIA POSTPROCEDURE EVALUATION
Patient: Laura Witt    Procedure Summary       Date: 08/21/24 Room / Location:  [facility] / [facility]    Anesthesia Start: 1027 Anesthesia Stop: 1237    Procedure: COMPLETION THYROIDECTOMY (Neck) Diagnosis: Right thyroid nodule    Surgeons: Jimmy Whipple MD Provider: Gael Forte MD    Anesthesia Type: general ASA Status: 3            Anesthesia Type: general    Vitals  No vitals data found for the desired time range.          Post Anesthesia Care and Evaluation    Patient location during evaluation: PACU  Patient participation: complete - patient participated  Level of consciousness: awake and alert  Pain score: 0  Pain management: adequate    Airway patency: patent  Anesthetic complications: No anesthetic complications  PONV Status: none  Cardiovascular status: hemodynamically stable and acceptable  Respiratory status: nonlabored ventilation, acceptable and nasal cannula  Hydration status: acceptable

## 2024-08-21 NOTE — OP NOTE
General Surgery Operative Note    THYROIDECTOMY  Procedure Report    Patient Name:  Laura Witt  YOB: 1975  [id number]     Date of Surgery:  8/21/2024       Pre-op Diagnosis: Right thyroid nodule    Post-op Diagnosis: Right thyroid nodule    Procedure(s):  COMPLETION THYROIDECTOMY    Surgeon: Jimmy Whipple MD    Assistant: Adrian Grimm PA     Anesthesia: General         Estimated Blood Loss: 100ml    Complications: None     Implants:    Implant Name Type Inv. Item Serial No.  Lot No. LRB No. Used Action   CLIP LIGAT VASC HORIZON TI MD JESSENIA 6CT - [id number] Implant CLIP LIGAT VASC HORIZON TI MD JESSENIA 6CT  [employer] [id number] N/A 9 Implanted   CLIP LIGAT VASC HORIZON TI SM YEL 6CT - [id number] Implant CLIP LIGAT VASC HORIZON TI SM YEL 6CT  [employer]  N/A 8 Implanted   HEMOST ABS SURGICEL ORIG 4X8IN STRL - [id number] Implant HEMOST ABS SURGICEL ORIG 4X8IN STRL  [employer]  DIV OF J AND J [id number] N/A 1 Implanted       Specimen:          Specimens       ID Source Type Tests Collected By Collected At Frozen?    A Thyroid Tissue TISSUE PATHOLOGY EXAM   Jimmy Whipple MD 8/21/24 1208     Description: right thyroid for permanent    This specimen was not marked as sent.                Findings: Hypervascular right thyroid lobe which was approximately 10 times the size of a normal thyroid gland    Indications: The patient is a 48-year-old female with a history of a left thyroid lobectomy who had a large right thyroid lobe nodule which had increased in size to over 4 cm.  I discussed the risk, benefits, and alternatives to completion thyroidectomy with the patient and her mother and they were agreeable.  Informed consent was obtained.    Description of Procedure:     After obtaining informed consent, the patient was taken to the operating room and placed in supine position. After appropriate DVT and antibiotic prophylaxis, general anesthesia was induced with  placement of a NIM tube for nerve monitoring. The neck was prepped and draped in standard sterile fashion.    An approximately 7 cm incision was made 2 fingerbreadths superior to the suprasternal notch transversely across the midline neck.  The skin was incised using a 15 blade.  The dermis and subcutaneous tissue were divided using Bovie electrocautery.  The platysma was dissected out and divided using Bovie electrocautery.  Subplatysmal flaps were created superiorly to the thyroid cartilage, inferiorly to the sternal notch, and laterally to the sternocleidomastoid muscles.  The strap muscles were divided in the midline at the median raphae.  Attention was paid to the right neck.  The strap muscles were dissected off the anterior and lateral surfaces of the thyroid lobe using Bovie electrocautery.  Babcocks were placed on the thyroid lobe and it was retracted anteriorly and medially.  Superior thyroid vessels were ligated with combination of clips and Harmonic.  The superior and inferior parathyroid glands were identified and dissected away from the thyroid lobe with blood supply left intact.  The recurrent laryngeal nerve was identified by both nerve stimulation and visualization.  Soft tissues around this area were dissected using bipolar electrocautery and small vessels ligated with clips.  Once the thyroid was dissected away from the area of the nerve the remainder of the thyroid lobe was dissected off the anterior surface of the trachea using bipolar cautery.  Inferior thyroid vessels were ligated with combination of clips and Harmonic. The specimen was then removed from the body and inspected for parathyroid tissue, and none was noted. It was then sent to pathology as a permanent specimen.    Valsalva maneuver was performed by anesthesia to assess for any further bleeding and any further bleeding was controlled using placement of clips.  After hemostasis was obtained the right recurrent laryngeal nerve was  grossly intact throughout the entire visualized course and stimulated appropriately with nerve stimulation.  Surgicel was placed in all areas of dissection.  The strap muscles were loosely reapproximated using interrupted 3-0 Vicryl.  The platysma was reapproximated using interrupted 3-0 Vicryl.  The skin was reapproximated using a running subcuticular 4-0 Monocryl.  A dry dressing was placed on top of this.  The patient awoke from anesthesia without complications and was taken to the PACU in stable condition.    EXTENUATING CIRCUMSTANCES: This case took approximately 30 minutes longer than a typical completion thyroidectomy due to a right thyroid lobe which was approximately 10 times the size of a normal thyroid lobe.      Assistant: Adrian Grimm PA  was responsible for performing the following activities: Retraction, Suction, Suturing, Closing, and Placing Dressing and their skilled assistance was necessary for the success of this case.    Jimmy Whipple MD     Date: 8/21/2024  Time: 12:21 EDT

## 2024-08-21 NOTE — H&P
Pre-Op H&P  Laura Witt  [id number]  1975      Chief complaint: Thyroid Cyst      Subjective:  Patient is a 48 y.o.female presents for scheduled surgery by Dr. Whipple. She anticipates a COMPLETION THYROIDECTOMY today.    Review of Systems:  Constitutional-- No fever, chills or sweats. No fatigue.  CV-- No chest pain, palpitation or syncope. +HLD.  Resp-- No SOB, cough, hemoptysis  Skin--No rashes or lesions      Allergies:   Allergies   Allergen Reactions    Bee Venom Anaphylaxis         Home Meds:  Medications Prior to Admission   Medication Sig Dispense Refill Last Dose    ALPRAZolam (XANAX) 1 MG tablet TAKE ONE TABLET BY MOUTH EVERY DAY FOR ANXIETY  0 8/21/2024 at 0500    atorvastatin (LIPITOR) 10 MG tablet TAKE ONE TABLET BY MOUTH EVERY NIGHT AT BEDTIME TO LOWER CHOLESTEROL  3 8/20/2024 at 2100    brivaracetam (Briviact) 50 MG tablet Take 1 tablet by mouth 2 (Two) Times a Day.   8/21/2024 at 0500    cetirizine (zyrTEC) 10 MG tablet Take 1 tablet by mouth Daily.   8/20/2024 at 0800    Cholecalciferol (VITAMIN D3) 38997 units capsule TAKE ONE CAPSULE BY MOUTH EVERY WEEK FOR THE BONES  2 Past Week    diazePAM (Valtoco 10 MG Dose) 10 MG/0.1ML liquid into the nostril(s) as directed by provider.   Past Week    docusate sodium (COLACE) 100 MG capsule Take 1 capsule by mouth Daily.   Past Week    escitalopram (LEXAPRO) 10 MG tablet Take 1 tablet by mouth Daily. as directed  3 8/20/2024 at 0800    famotidine (PEPCID) 20 MG tablet Take 1 tablet by mouth Every Other Day.   8/20/2024 at 0800    ibuprofen (ADVIL,MOTRIN) 600 MG tablet Take 1 tablet by mouth Every 6 (Six) Hours. 30 tablet 0 Past Week    LAMICTAL 150 MG tablet Take 200 mg by mouth 2 (Two) Times a Day. as directed  2 8/21/2024 at 0500    latanoprost (XALATAN) 0.005 % ophthalmic solution 1 drop Every Night.   8/20/2024 at 200    omeprazole (priLOSEC) 40 MG capsule Take 1 capsule by mouth Daily.  3 8/20/2024 at 0800    ONFI 20 MG tablet Take 1  tablet by mouth 2 (Two) Times a Day. as directed  2 8/21/2024 at 0500    VIMPAT 200 MG tablet Take 1 tablet by mouth 2 (Two) Times a Day. as directed  2 8/21/2024 at 0500    ziprasidone (GEODON) 40 MG capsule TAKE ONE CAPSULE BY MOUTH EVERY DAY AT NOON AS DIRECTED  2 8/20/2024 at 1600    ziprasidone (GEODON) 80 MG capsule TAKE ONE CAPSULE BY MOUTH EVERY NIGHT AT BEDTIME AS DIRECTED  2 8/20/2024 at 2000    EPINEPHrine (EPIPEN 2-RUSH IJ) Inject 1 pen as directed As Needed.       melatonin 3 MG tablet Take 1 tablet by mouth At Night As Needed.  0 More than a month    NYSTATIN 853949 UNIT/GM topical powder APPLY TO AFFECTED AREA EVERY DAY AS NEEDED  0 More than a month         PMH:   Past Medical History:   Diagnosis Date    Arthritis     Elevated cholesterol     GERD (gastroesophageal reflux disease)     Glaucoma     Hypothyroid     mass    PONV (postoperative nausea and vomiting)     Scoliosis     Seizures     statuss seizures       PSH:    Past Surgical History:   Procedure Laterality Date    ABDOMINAL SURGERY      COLONOSCOPY      D & C HYSTEROSCOPY N/A 2/21/2022    Procedure: HYSTEROSCOPY WITH AND WITHOUT DILATATION AND CURETTAGE;  Surgeon: Ella Leal MD;  Location: [facility];  Service: Obstetrics/Gynecology;  Laterality: N/A;    ENDOMETRIAL ABLATION      IMPLANTATION VAGAL NERVE STIMULATOR      THYROID SURGERY      TONSILLECTOMY      TUBAL ABDOMINAL LIGATION      VAGAL NERVE STIMULATOR REMOVAL N/A 9/10/2019    Procedure: VAGUS NERVE STIMULATOR REMOVAL;  Surgeon: Merly Jordan MD;  Location: [facility];  Service: General    VAGUS NERVE STIMULATOR IMPLANTATION N/A 9/10/2019    Procedure: VAGUS NERVE STIMULATOR IMPLANTATION;  Surgeon: Merly Jordan MD;  Location: [facility] OR;  Service: General       Immunization History:  Influenza: Yes  Pneumococcal: No  Tetanus: Yes  Covid : x4    Social History:   Tobacco:   Social History     Tobacco Use   Smoking Status Never   Smokeless Tobacco Never  "     Alcohol:     Social History     Substance and Sexual Activity   Alcohol Use No         Physical Exam:/60 (BP Location: Right arm, Patient Position: Lying)   Pulse 63   Temp 97.1 °F (36.2 °C) (Tympanic)   Resp 18   Ht 160 cm (63\")   Wt 78 kg (172 lb)   SpO2 97%   BMI 30.47 kg/m²       General Appearance:    Alert, cooperative, no distress, appears stated age   Head:    Normocephalic, without obvious abnormality, atraumatic   Lungs:     Clear to auscultation bilaterally, respirations unlabored    Heart:   Regular rate and rhythm, S1 and S2 normal    Abdomen:    Soft without tenderness   Extremities:   Extremities normal, atraumatic, no cyanosis or edema   Skin:   Skin color, texture, turgor normal, no rashes or lesions   Neurologic:   Grossly intact     Results Review:     LABS:  Lab Results   Component Value Date    WBC 5.00 08/07/2024    HGB 11.3 (L) 08/07/2024    HCT 34.6 08/07/2024    MCV 95.8 08/07/2024     08/07/2024    NEUTROABS 3.68 02/18/2022    GLUCOSE 97 02/18/2022    BUN 10 02/18/2022    CREATININE 0.73 02/18/2022    EGFRIFNONA 85 02/18/2022     02/18/2022    K 4.1 02/18/2022     02/18/2022    CO2 25.8 02/18/2022    CALCIUM 8.8 02/18/2022       RADIOLOGY:  Imaging Results (Last 72 Hours)       ** No results found for the last 72 hours. **            I reviewed the patient's new clinical results.    Cancer Staging (if applicable)  Cancer Patient: __ yes _x_no __unknown; If yes, clinical stage T:__ N:__M:__, stage group or __N/A      Impression: Thyroid Cyst      Plan: COMPLETION THYROIDECTOMY       Anthony Yusuf, APRN   8/21/2024   10:21 EDT  "

## 2024-08-22 VITALS
BODY MASS INDEX: 30.48 KG/M2 | TEMPERATURE: 98.2 F | OXYGEN SATURATION: 98 % | WEIGHT: 172 LBS | SYSTOLIC BLOOD PRESSURE: 128 MMHG | HEIGHT: 63 IN | RESPIRATION RATE: 17 BRPM | HEART RATE: 74 BPM | DIASTOLIC BLOOD PRESSURE: 74 MMHG

## 2024-08-22 LAB — CALCIUM SPEC-SCNC: 8.2 MG/DL (ref 8.6–10.5)

## 2024-08-22 PROCEDURE — A9270 NON-COVERED ITEM OR SERVICE: HCPCS | Performed by: SURGERY

## 2024-08-22 PROCEDURE — 63710000001 ALPRAZOLAM 1 MG TABLET: Performed by: SURGERY

## 2024-08-22 PROCEDURE — 63710000001 ACETAMINOPHEN 325 MG TABLET: Performed by: SURGERY

## 2024-08-22 PROCEDURE — 82310 ASSAY OF CALCIUM: CPT | Performed by: SURGERY

## 2024-08-22 PROCEDURE — 63710000001: Performed by: SURGERY

## 2024-08-22 PROCEDURE — 63710000001 ESCITALOPRAM 10 MG TABLET: Performed by: SURGERY

## 2024-08-22 PROCEDURE — 63710000001 CALCITRIOL 0.25 MCG CAPSULE: Performed by: SURGERY

## 2024-08-22 PROCEDURE — 63710000001 OXYCODONE-ACETAMINOPHEN 5-325 MG TABLET: Performed by: SURGERY

## 2024-08-22 PROCEDURE — 63710000001 FAMOTIDINE 20 MG TABLET: Performed by: SURGERY

## 2024-08-22 PROCEDURE — 63710000001 LEVOTHYROXINE 125 MCG TABLET: Performed by: SURGERY

## 2024-08-22 PROCEDURE — 63710000001 CALCIUM CARBONATE 500 MG CHEWABLE TABLET: Performed by: SURGERY

## 2024-08-22 RX ORDER — OXYCODONE AND ACETAMINOPHEN 5; 325 MG/1; MG/1
1 TABLET ORAL EVERY 4 HOURS PRN
Qty: 10 TABLET | Refills: 0 | Status: SHIPPED | OUTPATIENT
Start: 2024-08-22 | End: 2024-08-31

## 2024-08-22 RX ORDER — CALCITRIOL 0.5 UG/1
0.5 CAPSULE, LIQUID FILLED ORAL EVERY 12 HOURS SCHEDULED
Qty: 60 CAPSULE | Refills: 2 | Status: SHIPPED | OUTPATIENT
Start: 2024-08-22 | End: 2024-11-20

## 2024-08-22 RX ORDER — CALCIUM CARBONATE 500 MG/1
2 TABLET, CHEWABLE ORAL 3 TIMES DAILY
Status: DISCONTINUED | OUTPATIENT
Start: 2024-08-22 | End: 2024-08-22 | Stop reason: HOSPADM

## 2024-08-22 RX ORDER — LEVOTHYROXINE SODIUM 125 UG/1
125 TABLET ORAL
Qty: 30 TABLET | Refills: 2 | Status: SHIPPED | OUTPATIENT
Start: 2024-08-23 | End: 2024-11-21

## 2024-08-22 RX ORDER — CALCIUM CARBONATE 500 MG/1
2 TABLET, CHEWABLE ORAL 3 TIMES DAILY
Qty: 180 TABLET | Refills: 2 | Status: SHIPPED | OUTPATIENT
Start: 2024-08-22 | End: 2024-11-20

## 2024-08-22 RX ADMIN — LEVOTHYROXINE SODIUM 125 MCG: 125 TABLET ORAL at 05:21

## 2024-08-22 RX ADMIN — BRIVARACETAM 50 MG: 50 TABLET, FILM COATED ORAL at 08:36

## 2024-08-22 RX ADMIN — ACETAMINOPHEN 650 MG: 325 TABLET ORAL at 08:37

## 2024-08-22 RX ADMIN — CALCIUM CARBONATE (ANTACID) CHEW TAB 500 MG 2 TABLET: 500 CHEW TAB at 08:37

## 2024-08-22 RX ADMIN — OXYCODONE HYDROCHLORIDE AND ACETAMINOPHEN 1 TABLET: 5; 325 TABLET ORAL at 00:55

## 2024-08-22 RX ADMIN — CALCITRIOL CAPSULES 0.25 MCG 0.5 MCG: 0.25 CAPSULE ORAL at 09:24

## 2024-08-22 RX ADMIN — LACOSAMIDE 200 MG: 200 TABLET ORAL at 08:50

## 2024-08-22 RX ADMIN — ALPRAZOLAM 1 MG: 1 TABLET ORAL at 08:36

## 2024-08-22 RX ADMIN — LAMOTRIGINE 200 MG: 200 TABLET ORAL at 08:51

## 2024-08-22 RX ADMIN — FAMOTIDINE 20 MG: 20 TABLET, FILM COATED ORAL at 08:36

## 2024-08-22 RX ADMIN — ESCITALOPRAM OXALATE 10 MG: 10 TABLET ORAL at 08:36

## 2024-08-22 NOTE — CASE MANAGEMENT/SOCIAL WORK
Continued Stay Note  [facility]     Patient Name: Laura Witt  MRN: [id number]  Today's Date: 8/22/2024    Admit Date: 8/21/2024        Discharge Plan       Row Name 08/22/24 0836       Plan    Final Discharge Disposition Code 01 - home or self-care                   Discharge Codes    No documentation.                 Expected Discharge Date and Time       Expected Discharge Date Expected Discharge Time    Aug 22, 2024               PRAKASH Royal

## 2024-08-22 NOTE — PLAN OF CARE
Problem: Adult Inpatient Plan of Care  Goal: Plan of Care Review  Outcome: Ongoing, Progressing  Flowsheets (Taken 8/22/2024 0614)  Progress: improving  Plan of Care Reviewed With: patient  Outcome Evaluation: VSS. Adequate I&O. Tylenol prn x1 for c/o HA. Percocet prn x1 for c/o incisional pain. Ambulated in halls. Rested well. Anticipate DC in am.   Goal Outcome Evaluation:  Plan of Care Reviewed With: patient        Progress: improving  Outcome Evaluation: VSS. Adequate I&O. Tylenol prn x1 for c/o HA. Percocet prn x1 for c/o incisional pain. Ambulated in halls. Rested well. Anticipate DC in am.

## 2024-08-22 NOTE — DISCHARGE SUMMARY
Patient Name:  Laura Witt  YOB: 1975  [id number]      Date of Discharge: 8/22/2024    Discharge Diagnosis:   Right thyroid nodule    Problem List:  Active Hospital Problems    Diagnosis  POA    **Right thyroid nodule [E04.1]  Yes      Resolved Hospital Problems   No resolved problems to display.       Presenting Problem/History of Present Illness  Right thyroid nodule [E04.1]      Hospital Course  Patient is a 48 y.o. female presented with a right thyroid nodule.  On 8/21/2024 I performed a completion thyroidectomy. On postoperative day one, pain was well controlled. No numbness/tingling in fingers/toes/lips. No nausea/vomiting. No fevers/chills. Voiding spontaneously. Ambulating appropriately. The patient was thus found to be safe for discharge to home.           Procedures Performed    Procedure(s):  COMPLETION THYROIDECTOMY  -------------------       Consults:   Consults       No orders found for last 30 day(s).            Pertinent Test Results: N/A    Condition on Discharge:  Pain controlled with oral pain medication, tolerating diet, ambulating appropriately      Vital Signs  Temp:  [96.4 °F (35.8 °C)-98.6 °F (37 °C)] 98.2 °F (36.8 °C)  Heart Rate:  [] 74  Resp:  [13-22] 17  BP: ()/(56-95) 128/74    Discharge Disposition  Home or Self Care    Discharge Medications     Discharge Medications        New Medications        Instructions Start Date   calcitriol 0.5 MCG capsule  Commonly known as: ROCALTROL   0.5 mcg, Oral, Every 12 Hours Scheduled      calcium carbonate 500 MG chewable tablet  Commonly known as: TUMS   2 tablets, Oral, 3 Times Daily      levothyroxine 125 MCG tablet  Commonly known as: SYNTHROID, LEVOTHROID   125 mcg, Oral, Every Early Morning   Start Date: August 23, 2024     oxyCODONE-acetaminophen 5-325 MG per tablet  Commonly known as: PERCOCET   1 tablet, Oral, Every 4 Hours PRN             Continue These Medications        Instructions Start Date    ALPRAZolam 1 MG tablet  Commonly known as: XANAX   TAKE ONE TABLET BY MOUTH EVERY DAY FOR ANXIETY      atorvastatin 10 MG tablet  Commonly known as: LIPITOR   TAKE ONE TABLET BY MOUTH EVERY NIGHT AT BEDTIME TO LOWER CHOLESTEROL      Briviact 50 MG tablet  Generic drug: brivaracetam   50 mg, Oral, 2 Times Daily      cetirizine 10 MG tablet  Commonly known as: zyrTEC   10 mg, Oral, Daily      docusate sodium 100 MG capsule  Commonly known as: COLACE   100 mg, Oral, Daily      EPIPEN 2-RUSH IJ   1 pen , Injection, As Needed      escitalopram 10 MG tablet  Commonly known as: LEXAPRO   10 mg, Oral, Daily, as directed      famotidine 20 MG tablet  Commonly known as: PEPCID   20 mg, Oral, Every Other Day      ibuprofen 600 MG tablet  Commonly known as: ADVIL,MOTRIN   600 mg, Oral, Every 6 Hours      LaMICtal 150 MG tablet  Generic drug: lamoTRIgine   200 mg, Oral, 2 Times Daily, as directed       latanoprost 0.005 % ophthalmic solution  Commonly known as: XALATAN   1 drop, Nightly      melatonin 3 MG tablet   3 mg, Oral, Nightly PRN      nystatin 412096 UNIT/GM topical powder  Generic drug: nystatin   APPLY TO AFFECTED AREA EVERY DAY AS NEEDED      omeprazole 40 MG capsule  Commonly known as: priLOSEC   40 mg, Oral, Daily      Onfi 20 MG tablet  Generic drug: cloBAZam   20 mg, Oral, 2 Times Daily, as directed       Valtoco 10 MG Dose 10 MG/0.1ML liquid  Generic drug: diazePAM   Nasal      Vimpat 200 MG tablet  Generic drug: lacosamide   1 tablet, Oral, 2 Times Daily, as directed       Vitamin D3 1.25 MG (13464 UT) capsule   TAKE ONE CAPSULE BY MOUTH EVERY WEEK FOR THE BONES      ziprasidone 40 MG capsule  Commonly known as: GEODON   TAKE ONE CAPSULE BY MOUTH EVERY DAY AT NOON AS DIRECTED      ziprasidone 80 MG capsule  Commonly known as: GEODON   TAKE ONE CAPSULE BY MOUTH EVERY NIGHT AT BEDTIME AS DIRECTED               Discharge Diet       Activity at Discharge  Activity Instructions       Discharge Activity      Fever  >100.4, increased pain, rapid neck swelling, new redness/drainage from incision, numbness/tingling in fingers/toes/lips not controlled by extra TUMS            Follow-up Appointments  Future Appointments   Date Time Provider Department Center   5/20/2025  1:45 PM Monica Agee APRN [facility]     Additional Instructions for the Follow-ups that You Need to Schedule       Discharge Follow-up with Specified Provider: Dr. Whipple; 2 Weeks   As directed      To: Dr. Whipple   Follow Up: 2 Weeks   Follow Up Details: Call [phone] to make an appointment        Notify Physician or Go To The ED For the Following Conditions   As directed      Fever >100.4, increased pain, rapid neck swelling, new redness/drainage from incision, numbness/tingling in fingers/toes/lips not controlled by extra TUMS    Order Comments: Fever >100.4, increased pain, rapid neck swelling, new redness/drainage from incision, numbness/tingling in fingers/toes/lips not controlled by extra TUMS                 Test Results Pending at Discharge  Pending Labs       Order Current Status    Tissue Pathology Exam In process            Time: Discharge 15 min    Jimmy Whipple MD   08/22/24   09:06 EDT

## 2024-09-13 LAB
CYTO UR: NORMAL
DX PRELIMINARY: NORMAL
LAB AP CASE REPORT: NORMAL
LAB AP CLINICAL INFORMATION: NORMAL
LAB AP DIAGNOSIS COMMENT: NORMAL
PATH REPORT.FINAL DX SPEC: NORMAL
PATH REPORT.GROSS SPEC: NORMAL

## 2024-09-18 ENCOUNTER — LAB (OUTPATIENT)
Dept: LAB | Facility: HOSPITAL | Age: 49
End: 2024-09-18
Payer: MEDICARE

## 2024-09-18 ENCOUNTER — TRANSCRIBE ORDERS (OUTPATIENT)
Dept: ADMINISTRATIVE | Facility: HOSPITAL | Age: 49
End: 2024-09-18
Payer: MEDICARE

## 2024-09-18 DIAGNOSIS — E04.1 NONTOXIC UNINODULAR GOITER: ICD-10-CM

## 2024-09-18 DIAGNOSIS — E04.1 NONTOXIC UNINODULAR GOITER: Primary | ICD-10-CM

## 2024-09-18 PROCEDURE — 82310 ASSAY OF CALCIUM: CPT

## 2024-09-18 PROCEDURE — 83970 ASSAY OF PARATHORMONE: CPT

## 2024-09-18 PROCEDURE — 36415 COLL VENOUS BLD VENIPUNCTURE: CPT

## 2024-09-18 PROCEDURE — 84439 ASSAY OF FREE THYROXINE: CPT

## 2024-09-18 PROCEDURE — 84443 ASSAY THYROID STIM HORMONE: CPT

## 2024-09-19 ENCOUNTER — TELEPHONE (OUTPATIENT)
Age: 49
End: 2024-09-19

## 2024-09-19 LAB
CALCIUM SPEC-SCNC: 9.1 MG/DL (ref 8.6–10.5)
PTH-INTACT SERPL-MCNC: 22 PG/ML (ref 15–65)
T4 FREE SERPL-MCNC: 1.43 NG/DL (ref 0.92–1.68)
TSH SERPL DL<=0.05 MIU/L-ACNC: 1.48 UIU/ML (ref 0.27–4.2)

## 2024-09-19 NOTE — TELEPHONE ENCOUNTER
Provider: HUSSEIN    Caller: ESTHELA ALEXANDER    Relationship to Patient: MOTHER    Phone Number: [phone]    Reason for Call: PATIENT MOTHER STATES NEEDS TO RESCHEDULE EGD

## 2024-11-11 ENCOUNTER — ANESTHESIA EVENT (OUTPATIENT)
Dept: PERIOP | Facility: HOSPITAL | Age: 49
End: 2024-11-11
Payer: MEDICARE

## 2024-11-11 ENCOUNTER — ANESTHESIA (OUTPATIENT)
Dept: PERIOP | Facility: HOSPITAL | Age: 49
End: 2024-11-11
Payer: MEDICARE

## 2024-11-11 ENCOUNTER — HOSPITAL ENCOUNTER (OUTPATIENT)
Facility: HOSPITAL | Age: 49
Setting detail: HOSPITAL OUTPATIENT SURGERY
Discharge: HOME | End: 2024-11-11
Attending: SURGERY | Admitting: SURGERY
Payer: MEDICARE

## 2024-11-11 VITALS
WEIGHT: 165 LBS | SYSTOLIC BLOOD PRESSURE: 106 MMHG | OXYGEN SATURATION: 99 % | HEART RATE: 60 BPM | HEIGHT: 64 IN | RESPIRATION RATE: 16 BRPM | DIASTOLIC BLOOD PRESSURE: 73 MMHG | BODY MASS INDEX: 28.17 KG/M2 | TEMPERATURE: 97.6 F

## 2024-11-11 DIAGNOSIS — R63.0 LOSS OF APPETITE: ICD-10-CM

## 2024-11-11 DIAGNOSIS — R63.4 UNINTENTIONAL WEIGHT LOSS: ICD-10-CM

## 2024-11-11 PROCEDURE — 25010000002 PROPOFOL 200 MG/20ML EMULSION: Performed by: NURSE ANESTHETIST, CERTIFIED REGISTERED

## 2024-11-11 PROCEDURE — 25010000002 LIDOCAINE PF 2% 2 % SOLUTION: Performed by: NURSE ANESTHETIST, CERTIFIED REGISTERED

## 2024-11-11 PROCEDURE — 25010000002 FENTANYL CITRATE (PF) 50 MCG/ML SOLUTION: Performed by: NURSE ANESTHETIST, CERTIFIED REGISTERED

## 2024-11-11 RX ORDER — MEPERIDINE HYDROCHLORIDE 25 MG/ML
12.5 INJECTION INTRAMUSCULAR; INTRAVENOUS; SUBCUTANEOUS
Status: DISCONTINUED | OUTPATIENT
Start: 2024-11-11 | End: 2024-11-11 | Stop reason: HOSPADM

## 2024-11-11 RX ORDER — MIDAZOLAM HYDROCHLORIDE 1 MG/ML
1 INJECTION, SOLUTION INTRAMUSCULAR; INTRAVENOUS
Status: DISCONTINUED | OUTPATIENT
Start: 2024-11-11 | End: 2024-11-11 | Stop reason: HOSPADM

## 2024-11-11 RX ORDER — IPRATROPIUM BROMIDE AND ALBUTEROL SULFATE 2.5; .5 MG/3ML; MG/3ML
3 SOLUTION RESPIRATORY (INHALATION) ONCE AS NEEDED
Status: DISCONTINUED | OUTPATIENT
Start: 2024-11-11 | End: 2024-11-11 | Stop reason: HOSPADM

## 2024-11-11 RX ORDER — SODIUM CHLORIDE, SODIUM LACTATE, POTASSIUM CHLORIDE, CALCIUM CHLORIDE 600; 310; 30; 20 MG/100ML; MG/100ML; MG/100ML; MG/100ML
125 INJECTION, SOLUTION INTRAVENOUS ONCE
Status: DISCONTINUED | OUTPATIENT
Start: 2024-11-11 | End: 2024-11-11 | Stop reason: HOSPADM

## 2024-11-11 RX ORDER — KETOROLAC TROMETHAMINE 30 MG/ML
30 INJECTION, SOLUTION INTRAMUSCULAR; INTRAVENOUS EVERY 6 HOURS PRN
Status: DISCONTINUED | OUTPATIENT
Start: 2024-11-11 | End: 2024-11-11 | Stop reason: HOSPADM

## 2024-11-11 RX ORDER — FENTANYL CITRATE 50 UG/ML
INJECTION, SOLUTION INTRAMUSCULAR; INTRAVENOUS AS NEEDED
Status: DISCONTINUED | OUTPATIENT
Start: 2024-11-11 | End: 2024-11-11 | Stop reason: SURG

## 2024-11-11 RX ORDER — OXYCODONE AND ACETAMINOPHEN 5; 325 MG/1; MG/1
1 TABLET ORAL ONCE AS NEEDED
Status: DISCONTINUED | OUTPATIENT
Start: 2024-11-11 | End: 2024-11-11 | Stop reason: HOSPADM

## 2024-11-11 RX ORDER — SODIUM CHLORIDE 9 MG/ML
40 INJECTION, SOLUTION INTRAVENOUS AS NEEDED
Status: DISCONTINUED | OUTPATIENT
Start: 2024-11-11 | End: 2024-11-11 | Stop reason: HOSPADM

## 2024-11-11 RX ORDER — PROPOFOL 10 MG/ML
INJECTION, EMULSION INTRAVENOUS AS NEEDED
Status: DISCONTINUED | OUTPATIENT
Start: 2024-11-11 | End: 2024-11-11 | Stop reason: SURG

## 2024-11-11 RX ORDER — FENTANYL CITRATE 50 UG/ML
50 INJECTION, SOLUTION INTRAMUSCULAR; INTRAVENOUS
Status: DISCONTINUED | OUTPATIENT
Start: 2024-11-11 | End: 2024-11-11 | Stop reason: HOSPADM

## 2024-11-11 RX ORDER — SODIUM CHLORIDE 0.9 % (FLUSH) 0.9 %
10 SYRINGE (ML) INJECTION AS NEEDED
Status: DISCONTINUED | OUTPATIENT
Start: 2024-11-11 | End: 2024-11-11 | Stop reason: HOSPADM

## 2024-11-11 RX ORDER — ONDANSETRON 2 MG/ML
4 INJECTION INTRAMUSCULAR; INTRAVENOUS AS NEEDED
Status: DISCONTINUED | OUTPATIENT
Start: 2024-11-11 | End: 2024-11-11 | Stop reason: HOSPADM

## 2024-11-11 RX ORDER — LIDOCAINE HYDROCHLORIDE 20 MG/ML
INJECTION, SOLUTION EPIDURAL; INFILTRATION; INTRACAUDAL; PERINEURAL AS NEEDED
Status: DISCONTINUED | OUTPATIENT
Start: 2024-11-11 | End: 2024-11-11 | Stop reason: SURG

## 2024-11-11 RX ORDER — SODIUM CHLORIDE 0.9 % (FLUSH) 0.9 %
10 SYRINGE (ML) INJECTION EVERY 12 HOURS SCHEDULED
Status: DISCONTINUED | OUTPATIENT
Start: 2024-11-11 | End: 2024-11-11 | Stop reason: HOSPADM

## 2024-11-11 RX ADMIN — PROPOFOL 50 MG: 10 INJECTION, EMULSION INTRAVENOUS at 11:15

## 2024-11-11 RX ADMIN — PROPOFOL 20 MG: 10 INJECTION, EMULSION INTRAVENOUS at 11:27

## 2024-11-11 RX ADMIN — PROPOFOL 50 MG: 10 INJECTION, EMULSION INTRAVENOUS at 11:19

## 2024-11-11 RX ADMIN — PROPOFOL 30 MG: 10 INJECTION, EMULSION INTRAVENOUS at 11:23

## 2024-11-11 RX ADMIN — PROPOFOL 50 MG: 10 INJECTION, EMULSION INTRAVENOUS at 11:11

## 2024-11-11 RX ADMIN — LIDOCAINE HYDROCHLORIDE 100 MG: 20 INJECTION, SOLUTION EPIDURAL; INFILTRATION; INTRACAUDAL; PERINEURAL at 11:03

## 2024-11-11 RX ADMIN — FENTANYL CITRATE 100 MCG: 50 INJECTION INTRAMUSCULAR; INTRAVENOUS at 11:03

## 2024-11-11 RX ADMIN — PROPOFOL 150 MG: 10 INJECTION, EMULSION INTRAVENOUS at 11:07

## 2024-11-11 NOTE — H&P
History of Present Illness:    History of Present Illness Laura is a 48-year-old female who presents for evaluation for loss of appetite and unintentional weight loss.  Patient is present with her caregiver who states that her last EGD and colonoscopy was in [place] several years ago.  They report that both were normal.  Patient's family complains of her having excessive gas.  Reports that she possibly had food poisoning in March and feels like that when her problems began approximately over the past 6 months patient has had unintentional weight loss and loss of appetite.  Complaining that she does not want to eat and that she is not hungry.  This is new for her.  However, patient is on a lot of daily medications and they wonder if it might be related.  She denies any blood in her stool.  Reports that at times she has trouble evacuating her bowels.  Denies any family history of colon cancer.  She denies any nausea or vomiting.  Also denies taking any blood thinning medications.     The following portions of the patient's history were reviewed and updated as appropriate: allergies, current medications, past family history, past medical history, past social history, past surgical history and problem list.     Past Medical History:  Medical History        Past Medical History:   Diagnosis Date    Arthritis      Elevated cholesterol      GERD (gastroesophageal reflux disease)      Glaucoma      Hypothyroid       mass    PONV (postoperative nausea and vomiting)      Scoliosis      Seizures       statuss seizures      Sleep apnea              Social History:  Social History   Social History           Socioeconomic History    Marital status: Single   Tobacco Use    Smoking status: Never    Smokeless tobacco: Never   Vaping Use    Vaping status: Never Used   Substance and Sexual Activity    Alcohol use: No    Drug use: No    Sexual activity: Defer            Family History:        Family History   Problem Relation  Age of Onset    Heart disease Maternal Grandmother      Cancer Maternal Grandfather           Brain    Breast cancer Neg Hx           Past Surgical History:  Surgical History         Past Surgical History:   Procedure Laterality Date    ABDOMINAL SURGERY        COLONOSCOPY        D & C HYSTEROSCOPY N/A 2/21/2022     Procedure: HYSTEROSCOPY WITH AND WITHOUT DILATATION AND CURETTAGE;  Surgeon: Ella Leal MD;  Location: [facility];  Service: Obstetrics/Gynecology;  Laterality: N/A;    ENDOMETRIAL ABLATION        IMPLANTATION VAGAL NERVE STIMULATOR        THYROID SURGERY        TONSILLECTOMY        TUBAL ABDOMINAL LIGATION        VAGAL NERVE STIMULATOR REMOVAL N/A 9/10/2019     Procedure: VAGUS NERVE STIMULATOR REMOVAL;  Surgeon: Merly Jordan MD;  Location: [facility];  Service: General    VAGUS NERVE STIMULATOR IMPLANTATION N/A 9/10/2019     Procedure: VAGUS NERVE STIMULATOR IMPLANTATION;  Surgeon: Merly Jordan MD;  Location: [facility];  Service: General            Problem List:  Problem List       Patient Active Problem List   Diagnosis    Partial symptomatic epilepsy with complex partial seizures, intractable, without status epilepticus    Thyroid cyst    Discharge of eye, right            Allergy:   Allergies        Allergies   Allergen Reactions    Bee Venom Anaphylaxis            Current Medications:   Current Medications[]Expand by Default          Current Outpatient Medications   Medication Sig Dispense Refill    ALPRAZolam (XANAX) 1 MG tablet TAKE ONE TABLET BY MOUTH EVERY DAY FOR ANXIETY   0    atorvastatin (LIPITOR) 10 MG tablet TAKE ONE TABLET BY MOUTH EVERY NIGHT AT BEDTIME TO LOWER CHOLESTEROL   3    brivaracetam (Briviact) 50 MG tablet Take 1 tablet by mouth 2 (Two) Times a Day.        cetirizine (zyrTEC) 10 MG tablet Take 1 tablet by mouth Daily.        Cholecalciferol (VITAMIN D3) 44320 units capsule TAKE ONE CAPSULE BY MOUTH EVERY WEEK FOR THE BONES   2    diazePAM (Valtoco 10 MG  Dose) 10 MG/0.1ML liquid into the nostril(s) as directed by provider.        docusate sodium (COLACE) 100 MG capsule Take 1 capsule by mouth Daily.        EPINEPHrine (EPIPEN 2-RUSH IJ) Inject 1 pen as directed As Needed.        escitalopram (LEXAPRO) 10 MG tablet Take 1 tablet by mouth Daily. as directed   3    famotidine (PEPCID) 20 MG tablet Take 1 tablet by mouth Every Other Day.        ibuprofen (ADVIL,MOTRIN) 600 MG tablet Take 1 tablet by mouth Every 6 (Six) Hours. 30 tablet 0    LAMICTAL 150 MG tablet Take 200 mg by mouth 2 (Two) Times a Day. as directed   2    latanoprost (XALATAN) 0.005 % ophthalmic solution 1 drop Every Night.        melatonin 3 MG tablet Take 1 tablet by mouth At Night As Needed.   0    NYSTATIN 693268 UNIT/GM topical powder APPLY TO AFFECTED AREA EVERY DAY AS NEEDED   0    omeprazole (priLOSEC) 40 MG capsule Take 1 capsule by mouth Daily.   3    ONFI 20 MG tablet Take 1 tablet by mouth 2 (Two) Times a Day. as directed   2    VIMPAT 200 MG tablet Take 1 tablet by mouth 2 (Two) Times a Day. as directed   2    ziprasidone (GEODON) 40 MG capsule TAKE ONE CAPSULE BY MOUTH EVERY DAY AT NOON AS DIRECTED   2    ziprasidone (GEODON) 80 MG capsule TAKE ONE CAPSULE BY MOUTH EVERY NIGHT AT BEDTIME AS DIRECTED   2      No current facility-administered medications for this visit.            Review of Systems:    Review of Systems   Constitutional:  Positive for appetite change and unexpected weight loss.            Physical Exam:   Physical Exam  Constitutional:       Appearance: Normal appearance.   HENT:      Head: Normocephalic and atraumatic.      Right Ear: External ear normal.      Left Ear: External ear normal.   Eyes:      Conjunctiva/sclera: Conjunctivae normal.   Pulmonary:      Effort: Pulmonary effort is normal.   Abdominal:      General: Abdomen is flat.      Palpations: Abdomen is soft.   Musculoskeletal:         General: Normal range of motion.      Cervical back: Normal range of motion  "and neck supple.   Skin:     General: Skin is warm and dry.      Capillary Refill: Capillary refill takes less than 2 seconds.   Neurological:      General: No focal deficit present.      Mental Status: She is alert and oriented to person, place, and time.   Psychiatric:         Mood and Affect: Mood normal.         Behavior: Behavior normal.            Vitals:  Blood pressure 112/68, height 162.6 cm (64\"), weight 77.9 kg (171 lb 12.8 oz), not currently breastfeeding.   Body mass index is 29.49 kg/m².         Assessment & Plan  Diagnoses and all orders for this visit:     1. Loss of appetite (Primary)  -     Case Request; Standing  -     Case Request     2. Unintentional weight loss  -     Case Request; Standing  -     Case Request     Other orders  -     Follow Anesthesia Guidelines / Protocol; Future  -     Follow Anesthesia Guidelines / Protocol; Standing  -     Verify / Perform Chlorhexidine Skin Prep; Standing  -     Obtain Informed Consent; Future  -     Provide NPO Instructions to Patient; Future  -     Chlorhexidine Skin Prep; Future  -     Place Sequential Compression Device; Standing  -     Maintain Sequential Compression Device; Standing        Laura is a 48-year-old female who presents with loss of appetite and unintentional weight loss.  To undergo EGD and colonoscopy Dr. Lobato.  Verbalized understanding prep instructions and procedure wished to proceed.  "

## 2024-11-11 NOTE — ANESTHESIA POSTPROCEDURE EVALUATION
Patient: Laura Witt    Procedure Summary       Date: 11/11/24 Room / Location:  [facility]  /  [facility]    Anesthesia Start: 1103 Anesthesia Stop: 1131    Procedures:       ESOPHAGOGASTRODUODENOSCOPY WITH ANESTHESIA (Esophagus)      COLONOSCOPY Diagnosis:       Loss of appetite      Unintentional weight loss      (Loss of appetite [R63.0])      (Unintentional weight loss [R63.4])    Surgeons: Lorene Lobato MD Provider: Bennie Marshall MD    Anesthesia Type: general ASA Status: 3            Anesthesia Type: general    Vitals  Vitals Value Taken Time   BP 82/50 11/11/24 1135   Temp 97.6 °F (36.4 °C) 11/11/24 1135   Pulse 47 11/11/24 1137   Resp 16 11/11/24 1135   SpO2 100 % 11/11/24 1137   Vitals shown include unfiled device data.        Post Anesthesia Care and Evaluation    Patient location during evaluation: bedside  Patient participation: complete - patient participated  Level of consciousness: awake and alert  Pain score: 1  Pain management: adequate    Airway patency: patent  Anesthetic complications: No anesthetic complications  PONV Status: none  Cardiovascular status: acceptable  Respiratory status: acceptable  Hydration status: acceptable
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
## 2024-11-12 LAB — REF LAB TEST METHOD: NORMAL

## 2024-11-26 ENCOUNTER — OFFICE VISIT (OUTPATIENT)
Dept: SURGERY | Facility: CLINIC | Age: 49
End: 2024-11-26
Payer: MEDICARE

## 2024-11-26 VITALS — WEIGHT: 165 LBS | BODY MASS INDEX: 28.17 KG/M2 | HEIGHT: 64 IN

## 2024-11-26 DIAGNOSIS — R63.4 WEIGHT LOSS: Primary | ICD-10-CM

## 2024-11-26 DIAGNOSIS — R63.0 LOSS OF APPETITE: ICD-10-CM

## 2024-11-27 NOTE — PROGRESS NOTES
"Patient Name:  Laura Witt  YOB: 1975  [id number]    Follow Up Note    Date of visit: 11/26/2024    Subjective   Subjective: Presents for follow up after upper endoscopy and colonscopy. Well appearing. Appetite has stabilized per caregiver. She is no longer losing a large amount of weight.          Objective     Objective:     Ht 162.6 cm (64.02\")   Wt 74.8 kg (165 lb)   BMI 28.31 kg/m²       CV:  Regular rate and rhythm  L:  Bilateral lung rise and fall, no use of accessory muscles   Abd:  Soft, nontender, nondistended  Ext:  No cyanosis, clubbing, edema      Assessment/ Plan:  Assessment   Diagnoses and all orders for this visit:    1. Weight loss (Primary)    2. Loss of appetite    Presents for follow up after upper and lower endoscopy. She is doing well on examination today and appetite has stabilized. Pathology reviewed and benign. Will plan for follow up PRN.     Lorene Boo MD       General Surgeon  [facility]       "

## 2024-12-05 ENCOUNTER — HOSPITAL ENCOUNTER (EMERGENCY)
Facility: HOSPITAL | Age: 49
Discharge: HOME | End: 2024-12-06
Attending: EMERGENCY MEDICINE
Payer: MEDICAID

## 2024-12-05 ENCOUNTER — APPOINTMENT (OUTPATIENT)
Dept: CT IMAGING | Facility: HOSPITAL | Age: 49
End: 2024-12-05
Payer: MEDICAID

## 2024-12-05 VITALS
HEIGHT: 62 IN | HEART RATE: 73 BPM | BODY MASS INDEX: 33.13 KG/M2 | OXYGEN SATURATION: 95 % | TEMPERATURE: 98 F | WEIGHT: 180 LBS | TEMPERATURE: 98 F | SYSTOLIC BLOOD PRESSURE: 141 MMHG | OXYGEN SATURATION: 95 % | BODY MASS INDEX: 33.13 KG/M2 | WEIGHT: 180 LBS | DIASTOLIC BLOOD PRESSURE: 81 MMHG | RESPIRATION RATE: 20 BRPM | SYSTOLIC BLOOD PRESSURE: 141 MMHG | HEIGHT: 62 IN | RESPIRATION RATE: 20 BRPM | DIASTOLIC BLOOD PRESSURE: 81 MMHG | HEART RATE: 73 BPM

## 2024-12-05 DIAGNOSIS — M54.2 NECK PAIN: Primary | ICD-10-CM

## 2024-12-05 LAB
AMPHET+METHAMPHET UR QL: NEGATIVE
AMPHET+METHAMPHET UR QL: NEGATIVE
AMPHETAMINES UR QL: NEGATIVE
AMPHETAMINES UR QL: NEGATIVE
BARBITURATES UR QL SCN: NEGATIVE
BARBITURATES UR QL SCN: NEGATIVE
BENZODIAZ UR QL SCN: POSITIVE
BENZODIAZ UR QL SCN: POSITIVE
BUPRENORPHINE SERPL-MCNC: NEGATIVE NG/ML
BUPRENORPHINE SERPL-MCNC: NEGATIVE NG/ML
CANNABINOIDS SERPL QL: NEGATIVE
CANNABINOIDS SERPL QL: NEGATIVE
COCAINE UR QL: NEGATIVE
COCAINE UR QL: NEGATIVE
FENTANYL UR-MCNC: NEGATIVE NG/ML
FENTANYL UR-MCNC: NEGATIVE NG/ML
METHADONE UR QL SCN: NEGATIVE
METHADONE UR QL SCN: NEGATIVE
OPIATES UR QL: NEGATIVE
OPIATES UR QL: NEGATIVE
OXYCODONE UR QL SCN: NEGATIVE
OXYCODONE UR QL SCN: NEGATIVE
PCP UR QL SCN: NEGATIVE
PCP UR QL SCN: NEGATIVE
TRICYCLICS UR QL SCN: NEGATIVE
TRICYCLICS UR QL SCN: NEGATIVE

## 2024-12-05 PROCEDURE — 72125 CT NECK SPINE W/O DYE: CPT

## 2024-12-05 PROCEDURE — 99284 EMERGENCY DEPT VISIT MOD MDM: CPT

## 2024-12-05 PROCEDURE — 72131 CT LUMBAR SPINE W/O DYE: CPT

## 2024-12-05 PROCEDURE — 80307 DRUG TEST PRSMV CHEM ANLYZR: CPT | Performed by: PHYSICIAN ASSISTANT

## 2024-12-05 PROCEDURE — 72131 CT LUMBAR SPINE W/O DYE: CPT | Performed by: RADIOLOGY

## 2024-12-05 PROCEDURE — 72125 CT NECK SPINE W/O DYE: CPT | Performed by: RADIOLOGY

## 2024-12-06 NOTE — ED PROVIDER NOTES
Subjective   History of Present Illness  49-year-old female presents to the ER via EMS chief complaint of fall.  Patient does have known history of seizures.  Source of history is also patient's mother.  Mother reports that the patient is at baseline.  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  Currently followed by Dr. Galarza.  Apparently patient fell at some point in time today complaining of neck pain.        Review of Systems   Constitutional: Negative.  Negative for fever.   HENT: Negative.     Respiratory: Negative.     Cardiovascular: Negative.  Negative for chest pain.   Gastrointestinal: Negative.  Negative for abdominal pain.   Endocrine: Negative.    Genitourinary: Negative.  Negative for dysuria.   Musculoskeletal:  Positive for neck pain.   Skin: Negative.  Positive for color change.   Neurological: Negative.    Psychiatric/Behavioral: Negative.     All other systems reviewed and are negative.      No past medical history on file.    No Known Allergies    No past surgical history on file.    No family history on file.    Social History     Socioeconomic History    Marital status: Single           Objective   Physical Exam  Vitals and nursing note reviewed.   Constitutional:       General: She is not in acute distress.     Appearance: She is well-developed. She is not diaphoretic.   HENT:      Head: Normocephalic and atraumatic.      Right Ear: External ear normal.      Left Ear: External ear normal.      Nose: Nose normal.   Eyes:      Conjunctiva/sclera: Conjunctivae normal.   Neck:      Vascular: No JVD.      Trachea: No tracheal deviation.   Cardiovascular:      Rate and Rhythm: Normal rate and regular rhythm.      Heart sounds: Normal heart sounds. No murmur heard.  Pulmonary:      Effort: Pulmonary effort is normal. No respiratory distress.      Breath sounds: Normal breath sounds. No wheezing.   Abdominal:      Palpations: Abdomen is soft.      Tenderness: There is no abdominal tenderness.    Musculoskeletal:         General: No deformity. Normal range of motion.      Cervical back: Normal range of motion and neck supple.   Skin:     General: Skin is warm and dry.      Coloration: Skin is not pale.      Findings: Bruising present. No erythema or rash.      Comments: Bruising noted to the left upper extremity.   Neurological:      Mental Status: She is alert. Mental status is at baseline.      Cranial Nerves: No cranial nerve deficit.         Procedures           ED Course  ED Course as of 12/06/24 0114   Fri Dec 06, 2024   0026 CT cervical spine rad interpreted:  1. Multilevel disc related degenerative changes.  2. No acute fracture or traumatic listhesis.   [RB]   0030 CT lumbar spine rad interpreted:  1. No acute fracture or traumatic listhesis. [RB]      ED Course User Index  [RB] Carmelo Diaz II, PA                                                       Medical Decision Making      Final diagnoses:   Neck pain       ED Disposition  ED Disposition       ED Disposition   Discharge    Condition   Stable    Comment   --               Serenity Villareal, APRN  [address]  [facility] [address]  [phone]    Schedule an appointment as soon as possible for a visit            Medication List      No changes were made to your prescriptions during this visit.            Carmelo Diaz II, PA  12/06/24 0114

## (undated) DEVICE — INTENDED USE FOR SURGICAL MARKING ON INTACT SKIN, ALSO PROVIDES A PERMANENT METHOD OF IDENTIFYING OBJECTS IN THE OPERATING ROOM: Brand: WRITESITE® REGULAR TIP SKIN MARKER

## (undated) DEVICE — 3M™ STERI-STRIP™ REINFORCED ADHESIVE SKIN CLOSURES, R1547, 1/2 IN X 4 IN (12 MM X 100 MM), 6 STRIPS/ENVELOPE: Brand: 3M™ STERI-STRIP™

## (undated) DEVICE — DRSNG SURESITE WNDW 4X4.5

## (undated) DEVICE — CONN Y IRR DISP 1P/U

## (undated) DEVICE — SYR LL TP 10ML STRL

## (undated) DEVICE — SUT PROLN 3/0 8832H

## (undated) DEVICE — SUT SILK 3/0 TIES 18IN A184H

## (undated) DEVICE — DISPOSABLE BIPOLAR FORCEPS 4" (10.2CM) JEWELERS, STRAIGHT 0.4MM TIP AND 12 FT. (3.6M) CABLE: Brand: KIRWAN

## (undated) DEVICE — DISH PETRI 3.5IN MD STRL LF

## (undated) DEVICE — SUT MNCRYL PLS ANTIB UD 4/0 PS2 18IN

## (undated) DEVICE — HOLDER: Brand: DEROYAL

## (undated) DEVICE — FRCP BX RADJAW4 NDL 2.8 240CM LG OG BX40

## (undated) DEVICE — Device

## (undated) DEVICE — KT CVR ULTRASND PROB PULL UP LXF 5X8

## (undated) DEVICE — PROBE 8225825X 3PK INCREMT STD PRASS TIP: Brand: NIM

## (undated) DEVICE — ANTIBACTERIAL UNDYED BRAIDED (POLYGLACTIN 910), SYNTHETIC ABSORBABLE SUTURE: Brand: COATED VICRYL

## (undated) DEVICE — SUT VIC 3/0 TIES 18IN J110T

## (undated) DEVICE — MARKER,SKIN,W/RULER,DUAL,STOP: Brand: MEDLINE

## (undated) DEVICE — PENCL SMOKE/EVAC MEGADYNE TELESCP 10FT

## (undated) DEVICE — HARMONIC FOCUS SHEARS 9CM LENGTH + ADAPTIVE TISSUE TECHNOLOGY FOR USE WITH BLUE HAND PIECE ONLY: Brand: HARMONIC FOCUS

## (undated) DEVICE — PK BASIC 70

## (undated) DEVICE — SUT SILK 0 SH 30IN K834H

## (undated) DEVICE — THE BITE BLOCK MAXI, LATEX FREE STRAP IS USED TO PROTECT THE ENDOSCOPE INSERTION TUBE FROM BEING BITTEN BY THE PATIENT.

## (undated) DEVICE — COR MINOR LITHOTOMY: Brand: MEDLINE INDUSTRIES, INC.

## (undated) DEVICE — DRAPE,INSTRUMENT,MAGNETIC,10X16: Brand: MEDLINE

## (undated) DEVICE — PK MINOR SPLT 10

## (undated) DEVICE — SUT SILK 3/0 RB1 K872H BX/36

## (undated) DEVICE — PCH INST SURG INVISISHIELD 2PCKT

## (undated) DEVICE — Device: Brand: DEFENDO AIR/WATER/SUCTION AND BIOPSY VALVE

## (undated) DEVICE — APPL CHLORAPREP W/TINT 26ML ORNG

## (undated) DEVICE — HDRST PAD EA/20PC

## (undated) DEVICE — NEEDLE, QUINCKE 22GX3.5": Brand: MEDLINE INDUSTRIES, INC.

## (undated) DEVICE — TUNNELER: Brand: CYBERONICS® TUNNELER MODEL 402

## (undated) DEVICE — ENDOGATOR AUXILIARY WATER JET CONNECTOR: Brand: ENDOGATOR

## (undated) DEVICE — BLANKT WARM LOWR/BDY 100X120CM

## (undated) DEVICE — PAD GRND REM POLYHESIVE A/ DISP

## (undated) DEVICE — SUT VIC 3/0 SH 27IN J416H

## (undated) DEVICE — APPL CHLORAPREP 26ML CLR

## (undated) DEVICE — UNDERGLV SURG BIOGEL INDICATOR LF PF 7.5

## (undated) DEVICE — VESSEL LOOPS X-RAY DETECTABLE: Brand: DEROYAL

## (undated) DEVICE — ROSEBUD DISSECTORS: Brand: DEROYAL

## (undated) DEVICE — ACCESSORY PACK: Brand: VNS THERAPY®  MODEL 502 ACCESSORY PACK

## (undated) DEVICE — ELECTRD BLD EZ CLN MOD XLNG 2.75IN

## (undated) DEVICE — ANESTHESIA CIRCUIT ADULT-LF: Brand: MEDLINE INDUSTRIES, INC.

## (undated) DEVICE — DBD-PACK,EENT,SIRUS,PK II: Brand: MEDLINE

## (undated) DEVICE — TOWEL,OR,DSP,ST,BLUE,STD,4/PK,20PK/CS: Brand: MEDLINE

## (undated) DEVICE — CYSTO/BLADDER IRRIGATION SET, REGULATING CLAMP

## (undated) DEVICE — GLV SURG BIOGEL LTX PF 7

## (undated) DEVICE — ULTRACLEAN ACCESSORY ELECTRODE, 1 INCH COATED NEEDLE WITH EXTENDED INSULATION: Brand: ULTRACLEAN

## (undated) DEVICE — GAUZE,SPONGE,4"X4",16PLY,XRAY,STRL,LF: Brand: MEDLINE

## (undated) DEVICE — AMD ANTIMICROBIAL NON-ADHERENT ISLAND DRESSING,0.2% POLYHEXAMETHYLENE BIGUANIDE HCI (PHMB): Brand: TELFA

## (undated) DEVICE — GLV SURG PREMIERPRO MIC LTX PF SZ6 BRN

## (undated) DEVICE — SUT SILK 2/0 TIES 18IN A185H

## (undated) DEVICE — GOWN,NON-REINFORCED,SIRUS,SET IN SLV,XXL: Brand: MEDLINE

## (undated) DEVICE — PATIENT RETURN ELECTRODE, SINGLE-USE, CONTACT QUALITY MONITORING, ADULT, WITH 9FT CORD, FOR PATIENTS WEIGING OVER 33LBS. (15KG): Brand: MEGADYNE

## (undated) DEVICE — TRAP FLD MINIVAC MEGADYNE 100ML

## (undated) DEVICE — GLV SURG PREMIERPRO MIC LTX PF SZ7 BRN

## (undated) DEVICE — EMG TUBE 8229707 NIM TRIVANTAGE 7.0MM ID: Brand: NIM TRIVANTAGE™